# Patient Record
Sex: FEMALE | Race: WHITE | NOT HISPANIC OR LATINO | Employment: UNEMPLOYED | URBAN - METROPOLITAN AREA
[De-identification: names, ages, dates, MRNs, and addresses within clinical notes are randomized per-mention and may not be internally consistent; named-entity substitution may affect disease eponyms.]

---

## 2023-02-02 PROBLEM — I10 BENIGN ESSENTIAL HYPERTENSION: Status: ACTIVE | Noted: 2023-02-02

## 2023-02-02 PROBLEM — R09.81 SINUS CONGESTION: Status: ACTIVE | Noted: 2023-02-02

## 2023-02-02 PROBLEM — E78.1 HYPERTRIGLYCERIDEMIA: Status: ACTIVE | Noted: 2023-02-02

## 2023-02-02 PROBLEM — K21.9 GERD WITHOUT ESOPHAGITIS: Status: ACTIVE | Noted: 2023-02-02

## 2023-02-02 PROBLEM — M17.10 ARTHRITIS OF KNEE: Status: ACTIVE | Noted: 2023-02-02

## 2023-02-02 PROBLEM — E78.5 HYPERLIPIDEMIA: Status: ACTIVE | Noted: 2023-02-02

## 2023-02-02 PROBLEM — H10.33 ACUTE CONJUNCTIVITIS, BILATERAL: Status: ACTIVE | Noted: 2023-02-02

## 2023-02-02 RX ORDER — VALSARTAN AND HYDROCHLOROTHIAZIDE 160; 12.5 MG/1; MG/1
1 TABLET, FILM COATED ORAL DAILY
COMMUNITY
Start: 2021-05-04 | End: 2023-03-16 | Stop reason: SDUPTHER

## 2023-02-02 RX ORDER — ALUMINUM ZIRCONIUM OCTACHLOROHYDREX GLY 16 G/100G
GEL TOPICAL
COMMUNITY
Start: 2022-06-10 | End: 2023-09-13 | Stop reason: ALTCHOICE

## 2023-02-02 RX ORDER — CELECOXIB 200 MG/1
1-2 CAPSULE ORAL DAILY
COMMUNITY
Start: 2021-12-06 | End: 2023-03-16 | Stop reason: SDUPTHER

## 2023-03-15 LAB
ALANINE AMINOTRANSFERASE (SGPT) (U/L) IN SER/PLAS: 18 U/L (ref 7–45)
ALBUMIN (G/DL) IN SER/PLAS: 4.2 G/DL (ref 3.4–5)
ALKALINE PHOSPHATASE (U/L) IN SER/PLAS: 57 U/L (ref 33–136)
ANION GAP IN SER/PLAS: 11 MMOL/L (ref 10–20)
ASPARTATE AMINOTRANSFERASE (SGOT) (U/L) IN SER/PLAS: 17 U/L (ref 9–39)
BILIRUBIN TOTAL (MG/DL) IN SER/PLAS: 0.7 MG/DL (ref 0–1.2)
CALCIUM (MG/DL) IN SER/PLAS: 10 MG/DL (ref 8.6–10.3)
CARBON DIOXIDE, TOTAL (MMOL/L) IN SER/PLAS: 31 MMOL/L (ref 21–32)
CHLORIDE (MMOL/L) IN SER/PLAS: 102 MMOL/L (ref 98–107)
CHOLESTEROL (MG/DL) IN SER/PLAS: 242 MG/DL (ref 0–199)
CHOLESTEROL IN HDL (MG/DL) IN SER/PLAS: 77.7 MG/DL
CHOLESTEROL/HDL RATIO: 3.1
CREATININE (MG/DL) IN SER/PLAS: 0.86 MG/DL (ref 0.5–1.05)
GFR FEMALE: 74 ML/MIN/1.73M2
GLUCOSE (MG/DL) IN SER/PLAS: 98 MG/DL (ref 74–99)
LDL: 128 MG/DL (ref 0–99)
POTASSIUM (MMOL/L) IN SER/PLAS: 4.2 MMOL/L (ref 3.5–5.3)
PROTEIN TOTAL: 6.7 G/DL (ref 6.4–8.2)
SODIUM (MMOL/L) IN SER/PLAS: 140 MMOL/L (ref 136–145)
TRIGLYCERIDE (MG/DL) IN SER/PLAS: 182 MG/DL (ref 0–149)
UREA NITROGEN (MG/DL) IN SER/PLAS: 16 MG/DL (ref 6–23)
VLDL: 36 MG/DL (ref 0–40)

## 2023-03-16 ENCOUNTER — OFFICE VISIT (OUTPATIENT)
Dept: PRIMARY CARE | Facility: CLINIC | Age: 68
End: 2023-03-16
Payer: MEDICARE

## 2023-03-16 VITALS
OXYGEN SATURATION: 98 % | HEIGHT: 68 IN | SYSTOLIC BLOOD PRESSURE: 130 MMHG | HEART RATE: 99 BPM | BODY MASS INDEX: 33.07 KG/M2 | TEMPERATURE: 96.9 F | WEIGHT: 218.2 LBS | DIASTOLIC BLOOD PRESSURE: 78 MMHG

## 2023-03-16 DIAGNOSIS — M17.10 ARTHRITIS OF KNEE: ICD-10-CM

## 2023-03-16 DIAGNOSIS — Z12.31 BREAST CANCER SCREENING BY MAMMOGRAM: ICD-10-CM

## 2023-03-16 DIAGNOSIS — Z00.00 MEDICARE ANNUAL WELLNESS VISIT, SUBSEQUENT: ICD-10-CM

## 2023-03-16 DIAGNOSIS — I10 BENIGN ESSENTIAL HYPERTENSION: Primary | ICD-10-CM

## 2023-03-16 DIAGNOSIS — E78.00 PURE HYPERCHOLESTEROLEMIA: ICD-10-CM

## 2023-03-16 DIAGNOSIS — Z00.00 ROUTINE GENERAL MEDICAL EXAMINATION AT HEALTH CARE FACILITY: ICD-10-CM

## 2023-03-16 PROBLEM — K21.9 GERD WITHOUT ESOPHAGITIS: Status: RESOLVED | Noted: 2023-02-02 | Resolved: 2023-03-16

## 2023-03-16 PROBLEM — R09.81 SINUS CONGESTION: Status: RESOLVED | Noted: 2023-02-02 | Resolved: 2023-03-16

## 2023-03-16 PROBLEM — H10.33 ACUTE CONJUNCTIVITIS, BILATERAL: Status: RESOLVED | Noted: 2023-02-02 | Resolved: 2023-03-16

## 2023-03-16 PROCEDURE — G0439 PPPS, SUBSEQ VISIT: HCPCS | Performed by: FAMILY MEDICINE

## 2023-03-16 PROCEDURE — 3075F SYST BP GE 130 - 139MM HG: CPT | Performed by: FAMILY MEDICINE

## 2023-03-16 PROCEDURE — 1036F TOBACCO NON-USER: CPT | Performed by: FAMILY MEDICINE

## 2023-03-16 PROCEDURE — 99397 PER PM REEVAL EST PAT 65+ YR: CPT | Performed by: FAMILY MEDICINE

## 2023-03-16 PROCEDURE — 3078F DIAST BP <80 MM HG: CPT | Performed by: FAMILY MEDICINE

## 2023-03-16 PROCEDURE — 1159F MED LIST DOCD IN RCRD: CPT | Performed by: FAMILY MEDICINE

## 2023-03-16 PROCEDURE — 1170F FXNL STATUS ASSESSED: CPT | Performed by: FAMILY MEDICINE

## 2023-03-16 RX ORDER — CELECOXIB 200 MG/1
200-400 CAPSULE ORAL 2 TIMES DAILY
Qty: 180 CAPSULE | Refills: 1 | Status: SHIPPED | OUTPATIENT
Start: 2023-03-16 | End: 2023-11-28 | Stop reason: ALTCHOICE

## 2023-03-16 RX ORDER — VALSARTAN AND HYDROCHLOROTHIAZIDE 160; 12.5 MG/1; MG/1
1 TABLET, FILM COATED ORAL DAILY
Qty: 90 TABLET | Refills: 1 | Status: SHIPPED | OUTPATIENT
Start: 2023-03-16 | End: 2023-09-13 | Stop reason: SDUPTHER

## 2023-03-16 ASSESSMENT — ENCOUNTER SYMPTOMS
DEPRESSION: 0
LOSS OF SENSATION IN FEET: 0
OCCASIONAL FEELINGS OF UNSTEADINESS: 0
ARTHRALGIAS: 1

## 2023-03-16 ASSESSMENT — ACTIVITIES OF DAILY LIVING (ADL)
TOILETING: INDEPENDENT
GROOMING: INDEPENDENT
PATIENT'S MEMORY ADEQUATE TO SAFELY COMPLETE DAILY ACTIVITIES?: YES
FEEDING YOURSELF: INDEPENDENT
BATHING: INDEPENDENT
JUDGMENT_ADEQUATE_SAFELY_COMPLETE_DAILY_ACTIVITIES: YES
WALKS IN HOME: INDEPENDENT
ADEQUATE_TO_COMPLETE_ADL: YES
HEARING - RIGHT EAR: FUNCTIONAL
HEARING - LEFT EAR: FUNCTIONAL
DRESSING YOURSELF: INDEPENDENT
ASSISTIVE_DEVICE: EYEGLASSES

## 2023-03-16 ASSESSMENT — PATIENT HEALTH QUESTIONNAIRE - PHQ9
2. FEELING DOWN, DEPRESSED OR HOPELESS: NOT AT ALL
SUM OF ALL RESPONSES TO PHQ9 QUESTIONS 1 AND 2: 0
1. LITTLE INTEREST OR PLEASURE IN DOING THINGS: NOT AT ALL

## 2023-09-13 ENCOUNTER — HOSPITAL ENCOUNTER (OUTPATIENT)
Dept: DATA CONVERSION | Facility: HOSPITAL | Age: 68
Discharge: HOME | End: 2023-09-13

## 2023-09-13 ENCOUNTER — OFFICE VISIT (OUTPATIENT)
Dept: PRIMARY CARE | Facility: CLINIC | Age: 68
End: 2023-09-13
Payer: MEDICARE

## 2023-09-13 VITALS
OXYGEN SATURATION: 97 % | BODY MASS INDEX: 33.75 KG/M2 | SYSTOLIC BLOOD PRESSURE: 176 MMHG | WEIGHT: 222 LBS | DIASTOLIC BLOOD PRESSURE: 84 MMHG | HEART RATE: 88 BPM

## 2023-09-13 DIAGNOSIS — F41.9 ANXIETY: ICD-10-CM

## 2023-09-13 DIAGNOSIS — M25.361 OTHER INSTABILITY, RIGHT KNEE: ICD-10-CM

## 2023-09-13 DIAGNOSIS — I10 BENIGN ESSENTIAL HYPERTENSION: ICD-10-CM

## 2023-09-13 DIAGNOSIS — M17.10 ARTHRITIS OF KNEE: ICD-10-CM

## 2023-09-13 DIAGNOSIS — M17.10 UNILATERAL PRIMARY OSTEOARTHRITIS, UNSPECIFIED KNEE: ICD-10-CM

## 2023-09-13 DIAGNOSIS — E78.1 HYPERTRIGLYCERIDEMIA: Primary | ICD-10-CM

## 2023-09-13 PROCEDURE — 1160F RVW MEDS BY RX/DR IN RCRD: CPT | Performed by: FAMILY MEDICINE

## 2023-09-13 PROCEDURE — 1159F MED LIST DOCD IN RCRD: CPT | Performed by: FAMILY MEDICINE

## 2023-09-13 PROCEDURE — 1036F TOBACCO NON-USER: CPT | Performed by: FAMILY MEDICINE

## 2023-09-13 PROCEDURE — 99214 OFFICE O/P EST MOD 30 MIN: CPT | Performed by: FAMILY MEDICINE

## 2023-09-13 PROCEDURE — 3077F SYST BP >= 140 MM HG: CPT | Performed by: FAMILY MEDICINE

## 2023-09-13 PROCEDURE — 3079F DIAST BP 80-89 MM HG: CPT | Performed by: FAMILY MEDICINE

## 2023-09-13 RX ORDER — VALSARTAN AND HYDROCHLOROTHIAZIDE 160; 12.5 MG/1; MG/1
1 TABLET, FILM COATED ORAL DAILY
Qty: 90 TABLET | Refills: 1 | Status: SHIPPED | OUTPATIENT
Start: 2023-09-13 | End: 2023-12-13 | Stop reason: SDUPTHER

## 2023-09-13 RX ORDER — HYDROXYZINE HYDROCHLORIDE 25 MG/1
25 TABLET, FILM COATED ORAL 2 TIMES DAILY
Qty: 60 TABLET | Refills: 3 | Status: SHIPPED | OUTPATIENT
Start: 2023-09-13 | End: 2023-11-28 | Stop reason: WASHOUT

## 2023-09-13 ASSESSMENT — ENCOUNTER SYMPTOMS
DEPRESSION: 0
ARTHRALGIAS: 1
OCCASIONAL FEELINGS OF UNSTEADINESS: 0
LOSS OF SENSATION IN FEET: 0

## 2023-09-13 ASSESSMENT — PATIENT HEALTH QUESTIONNAIRE - PHQ9
1. LITTLE INTEREST OR PLEASURE IN DOING THINGS: NOT AT ALL
2. FEELING DOWN, DEPRESSED OR HOPELESS: NOT AT ALL
SUM OF ALL RESPONSES TO PHQ9 QUESTIONS 1 AND 2: 0

## 2023-09-13 NOTE — PROGRESS NOTES
Subjective   Patient ID: Iqra Oh is a 67 y.o. female who presents for Follow-up (FOLLOW UP).    HPI     Review of Systems   Musculoskeletal:  Positive for arthralgias.   All other systems reviewed and are negative.  Pt is exp knee pain  No swelling  Pt denies cp, sob,edema, dizziness  Having some anxiety situaltional    Objective   /84   Pulse 88   Wt 101 kg (222 lb)   SpO2 97%   BMI 33.75 kg/m²     Physical Exam  Constitutional:       Appearance: Normal appearance.   HENT:      Head: Normocephalic and atraumatic.      Right Ear: Tympanic membrane, ear canal and external ear normal.      Left Ear: Tympanic membrane, ear canal and external ear normal.      Nose: Nose normal.      Mouth/Throat:      Mouth: Mucous membranes are moist.      Pharynx: Oropharynx is clear.   Eyes:      Extraocular Movements: Extraocular movements intact.      Conjunctiva/sclera: Conjunctivae normal.      Pupils: Pupils are equal, round, and reactive to light.   Cardiovascular:      Rate and Rhythm: Normal rate and regular rhythm.      Pulses: Normal pulses.      Heart sounds: Normal heart sounds.   Pulmonary:      Effort: Pulmonary effort is normal.      Breath sounds: Normal breath sounds.   Abdominal:      General: Abdomen is flat. Bowel sounds are normal.      Palpations: Abdomen is soft.   Musculoskeletal:         General: Tenderness present.      Cervical back: Normal range of motion and neck supple.      Comments: Dec rom   Skin:     General: Skin is warm and dry.      Capillary Refill: Capillary refill takes less than 2 seconds.   Neurological:      General: No focal deficit present.      Mental Status: She is alert and oriented to person, place, and time.   Psychiatric:         Mood and Affect: Mood normal.         Behavior: Behavior normal.         Thought Content: Thought content normal.     Repeat /90    Assessment/Plan   Diagnoses and all orders for this visit:  Hypertriglyceridemia  Benign essential  hypertension  Arthritis of knee  Anxiety atarax prn  Watch sodium, try calm or headspace rto 3m  Call if BP stays elevated

## 2023-09-15 ENCOUNTER — HOSPITAL ENCOUNTER (OUTPATIENT)
Dept: DATA CONVERSION | Facility: HOSPITAL | Age: 68
Discharge: HOME | End: 2023-09-15

## 2023-09-15 DIAGNOSIS — M17.11 UNILATERAL PRIMARY OSTEOARTHRITIS, RIGHT KNEE: ICD-10-CM

## 2023-09-15 DIAGNOSIS — Z01.818 ENCOUNTER FOR OTHER PREPROCEDURAL EXAMINATION: ICD-10-CM

## 2023-09-15 LAB
ALBUMIN SERPL-MCNC: 4.9 GM/DL (ref 3.5–5)
ALBUMIN/GLOB SERPL: 1.8 RATIO (ref 1.5–3)
ALP BLD-CCNC: 73 U/L (ref 35–125)
ALT SERPL-CCNC: 26 U/L (ref 5–40)
ANION GAP SERPL CALCULATED.3IONS-SCNC: 12 MMOL/L (ref 0–19)
ANTICOAGULANT: NORMAL
APTT PPP: 23.1 SEC (ref 22–32.5)
AST SERPL-CCNC: 23 U/L (ref 5–40)
BASOPHILS # BLD AUTO: 0.01 K/UL (ref 0–0.22)
BASOPHILS NFR BLD AUTO: 0.2 % (ref 0–1)
BILIRUB SERPL-MCNC: 0.5 MG/DL (ref 0.1–1.2)
BILIRUB UR QL STRIP.AUTO: NEGATIVE
BUN SERPL-MCNC: 17 MG/DL (ref 8–25)
BUN/CREAT SERPL: 17 RATIO (ref 8–21)
CALCIUM SERPL-MCNC: 10.5 MG/DL (ref 8.5–10.4)
CHLORIDE SERPL-SCNC: 100 MMOL/L (ref 97–107)
CLARITY UR: CLEAR
CO2 SERPL-SCNC: 27 MMOL/L (ref 24–31)
COLOR UR: YELLOW
CREAT SERPL-MCNC: 1 MG/DL (ref 0.4–1.6)
DEPRECATED RDW RBC AUTO: 39.1 FL (ref 37–54)
DIFFERENTIAL METHOD BLD: ABNORMAL
EOSINOPHIL # BLD AUTO: 0.04 K/UL (ref 0–0.45)
EOSINOPHIL NFR BLD: 0.6 % (ref 0–3)
EPITH CASTS #/AREA UR COMP ASSIST: ABNORMAL /HPF
ERYTHROCYTE [DISTWIDTH] IN BLOOD BY AUTOMATED COUNT: 11.7 % (ref 11.7–15)
GFR SERPL CREATININE-BSD FRML MDRD: 62 ML/MIN/1.73 M2
GLOBULIN SER-MCNC: 2.8 G/DL (ref 1.9–3.7)
GLUCOSE SERPL-MCNC: 110 MG/DL (ref 65–99)
GLUCOSE UR STRIP.AUTO-MCNC: NEGATIVE MG/DL
HCT VFR BLD AUTO: 44.8 % (ref 36–44)
HGB BLD-MCNC: 15.1 GM/DL (ref 12–15)
HGB UR QL STRIP.AUTO: ABNORMAL /HPF (ref 0–3)
HGB UR QL: ABNORMAL
IMM GRANULOCYTES # BLD AUTO: 0.01 K/UL (ref 0–0.1)
INR PPP: 1 (ref 0.86–1.16)
KETONES UR QL STRIP.AUTO: NEGATIVE
LEUKOCYTE ESTERASE UR QL STRIP.AUTO: NEGATIVE
LYMPHOCYTES # BLD AUTO: 1.6 K/UL (ref 1.2–3.2)
LYMPHOCYTES NFR BLD MANUAL: 25.6 % (ref 20–40)
MCH RBC QN AUTO: 30.4 PG (ref 26–34)
MCHC RBC AUTO-ENTMCNC: 33.7 % (ref 31–37)
MCV RBC AUTO: 90.1 FL (ref 80–100)
MICROSCOPIC (UA): ABNORMAL
MONOCYTES # BLD AUTO: 0.48 K/UL (ref 0–0.8)
MONOCYTES NFR BLD MANUAL: 7.7 % (ref 0–8)
MRSA DNA SPEC QL NAA+PROBE: NEGATIVE
NEUTROPHILS # BLD AUTO: 4.11 K/UL
NEUTROPHILS # BLD AUTO: 4.11 K/UL (ref 1.8–7.7)
NEUTROPHILS.IMMATURE NFR BLD: 0.2 % (ref 0–1)
NEUTS SEG NFR BLD: 65.7 % (ref 50–70)
NITRITE UR QL STRIP.AUTO: NEGATIVE
PH UR STRIP.AUTO: 6 [PH] (ref 4.6–8)
PLATELET # BLD AUTO: 281 K/UL (ref 150–450)
PMV BLD AUTO: 10.5 CU (ref 7–12.6)
POTASSIUM SERPL-SCNC: 4.1 MMOL/L (ref 3.4–5.1)
PROT SERPL-MCNC: 7.7 G/DL (ref 5.9–7.9)
PROT UR STRIP.AUTO-MCNC: NEGATIVE MG/DL
PROTHROMBIN TIME: 9.3 SEC (ref 9.3–12.7)
RBC # BLD AUTO: 4.97 M/UL (ref 4–4.9)
SODIUM SERPL-SCNC: 139 MMOL/L (ref 133–145)
SP GR UR STRIP.AUTO: 1.01 (ref 1–1.03)
SPECIMEN SOURCE: NORMAL
URINE CULTURE: ABNORMAL
UROBILINOGEN UR QL STRIP.AUTO: 0.2 MG/DL (ref 0–1)
WBC # BLD AUTO: 6.3 K/UL (ref 4.5–11)
WBC #/AREA URNS AUTO: ABNORMAL /HPF (ref 0–3)

## 2023-09-29 ENCOUNTER — HOSPITAL ENCOUNTER (OUTPATIENT)
Dept: DATA CONVERSION | Facility: HOSPITAL | Age: 68
Discharge: HOME | End: 2023-09-29

## 2023-09-29 ENCOUNTER — HOME HEALTH ADMISSION (OUTPATIENT)
Dept: HOME HEALTH SERVICES | Facility: HOME HEALTH | Age: 68
End: 2023-09-29
Payer: MEDICARE

## 2023-09-29 DIAGNOSIS — M17.11 UNILATERAL PRIMARY OSTEOARTHRITIS, RIGHT KNEE: ICD-10-CM

## 2023-09-30 ENCOUNTER — HOME CARE VISIT (OUTPATIENT)
Dept: HOME HEALTH SERVICES | Facility: HOME HEALTH | Age: 68
End: 2023-09-30
Payer: MEDICARE

## 2023-09-30 PROCEDURE — 1090000002 HH PPS REVENUE DEBIT

## 2023-09-30 PROCEDURE — G0151 HHCP-SERV OF PT,EA 15 MIN: HCPCS | Mod: HHH

## 2023-09-30 PROCEDURE — 0023 HH SOC

## 2023-09-30 PROCEDURE — 1090000001 HH PPS REVENUE CREDIT

## 2023-09-30 PROCEDURE — 169592 NO-PAY CLAIM PROCEDURE

## 2023-10-01 VITALS — DIASTOLIC BLOOD PRESSURE: 66 MMHG | SYSTOLIC BLOOD PRESSURE: 120 MMHG | HEART RATE: 70 BPM

## 2023-10-01 PROCEDURE — 1090000002 HH PPS REVENUE DEBIT

## 2023-10-01 PROCEDURE — 1090000001 HH PPS REVENUE CREDIT

## 2023-10-01 SDOH — HEALTH STABILITY: PHYSICAL HEALTH: EXERCISE ACTIVITY: SLR 3 DIRECTIONS

## 2023-10-01 SDOH — HEALTH STABILITY: PHYSICAL HEALTH: EXERCISE ACTIVITY: AP

## 2023-10-01 SDOH — HEALTH STABILITY: PHYSICAL HEALTH: EXERCISE ACTIVITIES SETS: 1

## 2023-10-01 SDOH — HEALTH STABILITY: PHYSICAL HEALTH: EXERCISE ACTIVITY: HS

## 2023-10-01 SDOH — HEALTH STABILITY: PHYSICAL HEALTH: EXERCISE ACTIVITY: QS

## 2023-10-01 SDOH — HEALTH STABILITY: PHYSICAL HEALTH: EXERCISE ACTIVITY: HR

## 2023-10-01 SDOH — HEALTH STABILITY: PHYSICAL HEALTH: PHYSICAL EXERCISE: 15

## 2023-10-01 SDOH — HEALTH STABILITY: PHYSICAL HEALTH: EXERCISE ACTIVITY: SAQ

## 2023-10-01 ASSESSMENT — PAIN SCALES - PAIN ASSESSMENT IN ADVANCED DEMENTIA (PAINAD)
BODYLANGUAGE: 0
CONSOLABILITY: 0
BODYLANGUAGE: 0 - RELAXED.
TOTALSCORE: 0
CONSOLABILITY: 0 - NO NEED TO CONSOLE.
FACIALEXPRESSION: 0 - SMILING OR INEXPRESSIVE.
NEGVOCALIZATION: 0 - NONE.
BREATHING: 0
FACIALEXPRESSION: 0
NEGVOCALIZATION: 0

## 2023-10-01 ASSESSMENT — ENCOUNTER SYMPTOMS
OCCASIONAL FEELINGS OF UNSTEADINESS: 1
PAIN: 1
LOWEST PAIN SEVERITY IN PAST 24 HOURS: 4/10
PERSON REPORTING PAIN: PATIENT
HIGHEST PAIN SEVERITY IN PAST 24 HOURS: 7/10
PAIN LOCATION: RIGHT KNEE
DEPRESSION: 0
LIMITED RANGE OF MOTION: 1
MUSCLE WEAKNESS: 1
LOSS OF SENSATION IN FEET: 1
PAIN SEVERITY GOAL: 3/10
SUBJECTIVE PAIN PROGRESSION: GRADUALLY IMPROVING

## 2023-10-01 ASSESSMENT — ACTIVITIES OF DAILY LIVING (ADL)
AMBULATION ASSISTANCE ON FLAT SURFACES: 1
AMBULATION ASSISTANCE: 1
OASIS_M1830: 02
CURRENT_FUNCTION: MODERATE ASSIST
AMBULATION ASSISTANCE: STAND BY ASSIST
PHYSICAL TRANSFERS ASSESSED: 1

## 2023-10-02 ENCOUNTER — HOME CARE VISIT (OUTPATIENT)
Dept: HOME HEALTH SERVICES | Facility: HOME HEALTH | Age: 68
End: 2023-10-02
Payer: MEDICARE

## 2023-10-02 PROCEDURE — 1090000001 HH PPS REVENUE CREDIT

## 2023-10-02 PROCEDURE — 1090000002 HH PPS REVENUE DEBIT

## 2023-10-03 ENCOUNTER — HOME CARE VISIT (OUTPATIENT)
Dept: HOME HEALTH SERVICES | Facility: HOME HEALTH | Age: 68
End: 2023-10-03
Payer: MEDICARE

## 2023-10-03 VITALS — SYSTOLIC BLOOD PRESSURE: 118 MMHG | HEART RATE: 70 BPM | TEMPERATURE: 98.7 F | DIASTOLIC BLOOD PRESSURE: 80 MMHG

## 2023-10-03 PROCEDURE — 1090000001 HH PPS REVENUE CREDIT

## 2023-10-03 PROCEDURE — G0151 HHCP-SERV OF PT,EA 15 MIN: HCPCS | Mod: HHH

## 2023-10-03 PROCEDURE — 1090000002 HH PPS REVENUE DEBIT

## 2023-10-03 SDOH — HEALTH STABILITY: PHYSICAL HEALTH: EXERCISE TYPE: TKA PROTOCOL SUPINE AND STANDING

## 2023-10-03 SDOH — HEALTH STABILITY: PHYSICAL HEALTH
EXERCISE COMMENTS: 10 REPETITIONS OF SUPINE HEP. ADDED STANDING EXERCISES TO HEP COMPLETED WITH VERBAL CUES AND DEMONSTRATION

## 2023-10-03 ASSESSMENT — ENCOUNTER SYMPTOMS
OCCASIONAL FEELINGS OF UNSTEADINESS: 0
MUSCLE WEAKNESS: 1
SUBJECTIVE PAIN PROGRESSION: GRADUALLY IMPROVING
PAIN SEVERITY GOAL: 2/10
PERSON REPORTING PAIN: PATIENT
LOWEST PAIN SEVERITY IN PAST 24 HOURS: 4/10
PAIN: 1
PAIN LOCATION: RIGHT KNEE
HIGHEST PAIN SEVERITY IN PAST 24 HOURS: 8/10
LIMITED RANGE OF MOTION: 1

## 2023-10-03 ASSESSMENT — ACTIVITIES OF DAILY LIVING (ADL)
AMBULATION ASSISTANCE: STAND BY ASSIST
AMBULATION ASSISTANCE: 1
ENTERING_EXITING_HOME: NEEDS ASSISTANCE

## 2023-10-04 ENCOUNTER — TELEPHONE (OUTPATIENT)
Dept: ORTHOPEDIC SURGERY | Facility: CLINIC | Age: 68
End: 2023-10-04
Payer: MEDICARE

## 2023-10-04 DIAGNOSIS — M17.11 PRIMARY OSTEOARTHRITIS OF RIGHT KNEE: Primary | ICD-10-CM

## 2023-10-04 PROCEDURE — 1090000002 HH PPS REVENUE DEBIT

## 2023-10-04 PROCEDURE — 1090000001 HH PPS REVENUE CREDIT

## 2023-10-04 NOTE — TELEPHONE ENCOUNTER
Patient had R TKA HUSSAIN on 9/29/23. She is requesting refill on Oxycodone 5mg and Tramadol 50mg.  [facility]

## 2023-10-05 PROCEDURE — 1090000001 HH PPS REVENUE CREDIT

## 2023-10-05 PROCEDURE — 1090000002 HH PPS REVENUE DEBIT

## 2023-10-06 ENCOUNTER — HOME CARE VISIT (OUTPATIENT)
Dept: HOME HEALTH SERVICES | Facility: HOME HEALTH | Age: 68
End: 2023-10-06
Payer: MEDICARE

## 2023-10-06 ENCOUNTER — TELEPHONE (OUTPATIENT)
Dept: ORTHOPEDIC SURGERY | Facility: CLINIC | Age: 68
End: 2023-10-06
Payer: MEDICARE

## 2023-10-06 VITALS — HEART RATE: 80 BPM | TEMPERATURE: 98.7 F

## 2023-10-06 DIAGNOSIS — Z98.890 HISTORY OF KNEE SURGERY: Primary | ICD-10-CM

## 2023-10-06 DIAGNOSIS — Z98.890 HISTORY OF KNEE SURGERY: ICD-10-CM

## 2023-10-06 PROCEDURE — 1090000001 HH PPS REVENUE CREDIT

## 2023-10-06 PROCEDURE — 1090000002 HH PPS REVENUE DEBIT

## 2023-10-06 PROCEDURE — G0151 HHCP-SERV OF PT,EA 15 MIN: HCPCS | Mod: HHH

## 2023-10-06 RX ORDER — TRAMADOL HYDROCHLORIDE 50 MG/1
50 TABLET ORAL EVERY 6 HOURS PRN
Qty: 28 TABLET | Refills: 0 | Status: SHIPPED | OUTPATIENT
Start: 2023-10-06 | End: 2023-10-13

## 2023-10-06 RX ORDER — TRAMADOL HYDROCHLORIDE 50 MG/1
50 TABLET ORAL EVERY 6 HOURS PRN
Qty: 10 TABLET | Status: CANCELLED | OUTPATIENT
Start: 2023-10-06

## 2023-10-06 RX ORDER — TRAMADOL HYDROCHLORIDE 50 MG/1
50 TABLET ORAL EVERY 6 HOURS PRN
Qty: 28 TABLET | Refills: 0 | Status: SHIPPED | OUTPATIENT
Start: 2023-10-06 | End: 2023-10-06 | Stop reason: SDUPTHER

## 2023-10-06 RX ORDER — OXYCODONE HYDROCHLORIDE 5 MG/1
5 TABLET ORAL EVERY 6 HOURS PRN
Qty: 28 TABLET | Refills: 0 | Status: SHIPPED | OUTPATIENT
Start: 2023-10-06 | End: 2023-10-06

## 2023-10-06 RX ORDER — OXYCODONE HYDROCHLORIDE 5 MG/1
5 TABLET ORAL
Qty: 15 TABLET | Status: CANCELLED | OUTPATIENT
Start: 2023-10-06

## 2023-10-06 RX ORDER — OXYCODONE HYDROCHLORIDE 5 MG/1
5 TABLET ORAL
Qty: 15 TABLET | Refills: 0 | Status: CANCELLED | OUTPATIENT
Start: 2023-10-06

## 2023-10-06 RX ORDER — OXYCODONE HYDROCHLORIDE 5 MG/1
5 TABLET ORAL EVERY 6 HOURS PRN
Qty: 28 TABLET | Refills: 0 | Status: SHIPPED | OUTPATIENT
Start: 2023-10-06 | End: 2023-10-06 | Stop reason: SDUPTHER

## 2023-10-06 RX ORDER — TRAMADOL HYDROCHLORIDE 50 MG/1
50 TABLET ORAL EVERY 6 HOURS PRN
Qty: 10 TABLET | Refills: 0 | Status: CANCELLED | OUTPATIENT
Start: 2023-10-06

## 2023-10-06 RX ORDER — TRAMADOL HYDROCHLORIDE 50 MG/1
50 TABLET ORAL EVERY 6 HOURS PRN
Qty: 28 TABLET | Refills: 0 | Status: SHIPPED | OUTPATIENT
Start: 2023-10-06 | End: 2023-10-06

## 2023-10-06 RX ORDER — OXYCODONE HYDROCHLORIDE 5 MG/1
5 TABLET ORAL EVERY 6 HOURS PRN
Qty: 28 TABLET | Refills: 0 | Status: SHIPPED | OUTPATIENT
Start: 2023-10-06 | End: 2023-10-13

## 2023-10-06 RX ORDER — OXYCODONE HYDROCHLORIDE 5 MG/1
5 TABLET ORAL EVERY 6 HOURS PRN
Qty: 28 TABLET | Refills: 0 | Status: CANCELLED | OUTPATIENT
Start: 2023-10-06 | End: 2023-10-13

## 2023-10-06 SDOH — HEALTH STABILITY: PHYSICAL HEALTH
EXERCISE COMMENTS: SUPINE AND STANDING HEP WITH VERBAL CUES. ABLE TO COMPLETE 2 SETS OF 10. ADDED STAIR STRETCHES INTO FLEXION FOR 1 TO 3 MINUTES.

## 2023-10-06 SDOH — HEALTH STABILITY: PHYSICAL HEALTH: EXERCISE TYPE: THA R LE

## 2023-10-06 ASSESSMENT — ENCOUNTER SYMPTOMS
PAIN: 1
PERSON REPORTING PAIN: PATIENT
LIMITED RANGE OF MOTION: 1
LOWEST PAIN SEVERITY IN PAST 24 HOURS: 3/10
HIGHEST PAIN SEVERITY IN PAST 24 HOURS: 6/10
SUBJECTIVE PAIN PROGRESSION: GRADUALLY IMPROVING
PAIN SEVERITY GOAL: 1/10
MUSCLE WEAKNESS: 1
PAIN LOCATION: RIGHT KNEE

## 2023-10-06 ASSESSMENT — ACTIVITIES OF DAILY LIVING (ADL)
AMBULATION_DISTANCE/DURATION_TOLERATED: 50 FEET
AMBULATION ASSISTANCE: STAND BY ASSIST
CURRENT_FUNCTION: STAND BY ASSIST
ENTERING_EXITING_HOME: MINIMUM ASSIST

## 2023-10-06 NOTE — HOME HEALTH
Treatment note: Surgical bandage was removed. The bandage underneath was stuck to the mepilex dressing and started to come off. Switched to removing it from the bottom and trimmed off the top that was no longer adhered to wound. Incision is intact with no drainage or signs of infection. Instructed in supine and standing HEP and she was able to complete 2 sets of 10. Added stair stretch to improve knee flexion. She called the surgeons office for a refill of pain medication and is waiting to hear back today. Making steady progress. She was able to schedule outpatient PT for 10/16/23.

## 2023-10-06 NOTE — TELEPHONE ENCOUNTER
Can you please send in a refill of the patients Oxycodone and Tramadol to [facility] in [place]? Thanks.

## 2023-10-06 NOTE — TELEPHONE ENCOUNTER
Patient is calling for a refill on her Oxycodone 5mg and Tramadol 50mg please send to pharmacy on file.

## 2023-10-07 PROCEDURE — 1090000002 HH PPS REVENUE DEBIT

## 2023-10-07 PROCEDURE — 1090000001 HH PPS REVENUE CREDIT

## 2023-10-08 PROCEDURE — 1090000001 HH PPS REVENUE CREDIT

## 2023-10-08 PROCEDURE — 1090000002 HH PPS REVENUE DEBIT

## 2023-10-09 ENCOUNTER — HOME CARE VISIT (OUTPATIENT)
Dept: HOME HEALTH SERVICES | Facility: HOME HEALTH | Age: 68
End: 2023-10-09
Payer: MEDICARE

## 2023-10-09 VITALS — HEART RATE: 76 BPM | DIASTOLIC BLOOD PRESSURE: 76 MMHG | SYSTOLIC BLOOD PRESSURE: 120 MMHG | TEMPERATURE: 98.6 F

## 2023-10-09 PROCEDURE — 1090000001 HH PPS REVENUE CREDIT

## 2023-10-09 PROCEDURE — G0180 MD CERTIFICATION HHA PATIENT: HCPCS | Performed by: ORTHOPAEDIC SURGERY

## 2023-10-09 PROCEDURE — 1090000002 HH PPS REVENUE DEBIT

## 2023-10-09 PROCEDURE — G0151 HHCP-SERV OF PT,EA 15 MIN: HCPCS | Mod: HHH

## 2023-10-10 ENCOUNTER — OFFICE VISIT (OUTPATIENT)
Dept: ORTHOPEDIC SURGERY | Facility: CLINIC | Age: 68
End: 2023-10-10
Payer: MEDICARE

## 2023-10-10 VITALS — HEIGHT: 68 IN | BODY MASS INDEX: 32.74 KG/M2 | WEIGHT: 216 LBS

## 2023-10-10 DIAGNOSIS — M17.11 LOCALIZED OSTEOARTHRITIS OF RIGHT KNEE: ICD-10-CM

## 2023-10-10 PROCEDURE — 99024 POSTOP FOLLOW-UP VISIT: CPT | Performed by: ORTHOPAEDIC SURGERY

## 2023-10-10 PROCEDURE — 1090000002 HH PPS REVENUE DEBIT

## 2023-10-10 PROCEDURE — 1160F RVW MEDS BY RX/DR IN RCRD: CPT | Performed by: ORTHOPAEDIC SURGERY

## 2023-10-10 PROCEDURE — 1090000001 HH PPS REVENUE CREDIT

## 2023-10-10 PROCEDURE — 1036F TOBACCO NON-USER: CPT | Performed by: ORTHOPAEDIC SURGERY

## 2023-10-10 PROCEDURE — 1159F MED LIST DOCD IN RCRD: CPT | Performed by: ORTHOPAEDIC SURGERY

## 2023-10-10 NOTE — PROGRESS NOTES
ORTHOPEDIC TOTAL JOINT  POST-OPERATIVE FOLLOW UP      ============================  IMPRESSION/PLAN:  ============================  68 y.o. female s/p Right Total Knee Replacement completed on 09/29/2023.    PLAN:  -Weightbearing: Weight bearing as tolerated  -DVT Prophylaxis: Continue aspirin 81 mg twice a day for 2 more weeks  -Radiology Studies: None today  -Therapies: Continue PT/OT, okay to begin outpatient therapy  -Transition off assistive device as seen fit by patient and therapy  -Follow-up: 4 weeks        Caroline COY Adriano presents today for follow up of joint replacement above. Pain controlled with current treatment plan. Patient has begun physical therapy. They are currently doing therapy at home. She is scheduled to start outpatient therapy next week.  They are currently ambulating with Walker.     Review of Systems:   Constitutional: See HPI for pain assessment, No significant weight loss, recent trauma. Denies fevers/chills  Cardiovascular: No chest pain, shortness of breath  Respiratory: No difficulty breathing, cough  Gastrointestinal: No nausea, vomiting, diarrhea, constipation  Musculoskeletal: Noted in HPI, no arthralgias   Integumentary: No rashes, easy bruising, redness   Neurological: no numbness or tingling in extremities, no gait disturbances     Patient Active Problem List   Diagnosis    Arthritis of knee    Benign essential hypertension    Hyperlipidemia    Hypertriglyceridemia    Medicare annual wellness visit, subsequent    Anxiety       =================================  EXAM  =================================  GENERAL: A/Ox3, NAD. Appears healthy, well nourished  CARDIAC: regular rate  LUNGS: Breathing non-labored    MUSCULOSKELETAL:  Laterality: right Knee Exam  - Incision: No overlying, erythema, induration, or drainage. Incision healing well with no wound dehiscence  - Palpation: minimal TTP along surgical incision  - Effusion: appropriated post op, 2+  - ROM: 5 to 95 degrees  -  Stability: Anterior/Posterior stable, varus/valgus stable  - compartments soft, negative homans    NEUROVASCULAR:  - Neurovascular Status: sensation intact to light touch distally  - Capillary refill brisk at extremities, Bilateral dorsalis pedis pulse 2+       Sofia Hamilton DO  Attending Surgeon  Joint Replacement and Adult Reconstructive Surgery  [place], [place]

## 2023-10-11 PROCEDURE — 1090000001 HH PPS REVENUE CREDIT

## 2023-10-11 PROCEDURE — 1090000002 HH PPS REVENUE DEBIT

## 2023-10-12 ENCOUNTER — HOME CARE VISIT (OUTPATIENT)
Dept: HOME HEALTH SERVICES | Facility: HOME HEALTH | Age: 68
End: 2023-10-12
Payer: MEDICARE

## 2023-10-12 VITALS — DIASTOLIC BLOOD PRESSURE: 80 MMHG | HEART RATE: 76 BPM | TEMPERATURE: 98.6 F | SYSTOLIC BLOOD PRESSURE: 118 MMHG

## 2023-10-12 PROCEDURE — 1090000001 HH PPS REVENUE CREDIT

## 2023-10-12 PROCEDURE — 1090000002 HH PPS REVENUE DEBIT

## 2023-10-12 PROCEDURE — G0151 HHCP-SERV OF PT,EA 15 MIN: HCPCS | Mod: HHH

## 2023-10-12 SDOH — HEALTH STABILITY: PHYSICAL HEALTH: EXERCISE TYPE: TKA

## 2023-10-12 ASSESSMENT — ENCOUNTER SYMPTOMS
SUBJECTIVE PAIN PROGRESSION: GRADUALLY IMPROVING
OCCASIONAL FEELINGS OF UNSTEADINESS: 1
PAIN: 1
PAIN LOCATION: RIGHT KNEE
HIGHEST PAIN SEVERITY IN PAST 24 HOURS: 5/10
PERSON REPORTING PAIN: PATIENT
MUSCLE WEAKNESS: 1
LIMITED RANGE OF MOTION: 1
PAIN SEVERITY GOAL: 2/10
LOWEST PAIN SEVERITY IN PAST 24 HOURS: 2/10

## 2023-10-12 ASSESSMENT — ACTIVITIES OF DAILY LIVING (ADL)
HOME_HEALTH_OASIS: 00
OASIS_M1830: 00

## 2023-10-13 NOTE — HOME HEALTH
Physical Therapy Discharge Visit Summary:  Patient completed HEP 2 sets of 10 using handout. Knee ROM -5 to 92 degrees. She is using the cane a few times per day to practice gait pattern, but still primarily uses the FWW. She saw her surgeon on Tuesday. Outpatient PT scheduled for monday 10/16. Agency discharge completed. Patient signed medicare notice of non-coverage.

## 2023-10-16 ENCOUNTER — EVALUATION (OUTPATIENT)
Dept: PHYSICAL THERAPY | Facility: CLINIC | Age: 68
End: 2023-10-16
Payer: MEDICARE

## 2023-10-16 DIAGNOSIS — M17.11 LOCALIZED OSTEOARTHRITIS OF RIGHT KNEE: ICD-10-CM

## 2023-10-16 DIAGNOSIS — Z96.651 STATUS POST RIGHT KNEE REPLACEMENT: Primary | ICD-10-CM

## 2023-10-16 DIAGNOSIS — M25.561 RIGHT KNEE PAIN: ICD-10-CM

## 2023-10-16 PROCEDURE — 97161 PT EVAL LOW COMPLEX 20 MIN: CPT | Mod: GP | Performed by: PHYSICAL THERAPIST

## 2023-10-16 PROCEDURE — 97140 MANUAL THERAPY 1/> REGIONS: CPT | Mod: GP | Performed by: PHYSICAL THERAPIST

## 2023-10-16 ASSESSMENT — ENCOUNTER SYMPTOMS
LOSS OF SENSATION IN FEET: 0
OCCASIONAL FEELINGS OF UNSTEADINESS: 0
DEPRESSION: 0

## 2023-10-16 ASSESSMENT — PATIENT HEALTH QUESTIONNAIRE - PHQ9
2. FEELING DOWN, DEPRESSED OR HOPELESS: NOT AT ALL
1. LITTLE INTEREST OR PLEASURE IN DOING THINGS: NOT AT ALL
SUM OF ALL RESPONSES TO PHQ9 QUESTIONS 1 AND 2: 0

## 2023-10-16 NOTE — LETTER
October 17, 2023     Patient: Caroline Oh   YOB: 1955   Date of Visit: 10/16/2023       To Whom It May Concern:    It is my medical opinion that Caroline Oh {Work release (duty restriction):74099}.    If you have any questions or concerns, please don't hesitate to call.         Sincerely,        Reina Arango, PT    CC: No Recipients
October 17, 2023     Patient: Caroline Oh   YOB: 1955   Date of Visit: 10/16/2023       To Whom it May Concern:    Caroline Oh was seen in my clinic on 10/16/2023. She {Return to school/sport:15405}.    If you have any questions or concerns, please don't hesitate to call.         Sincerely,          Reina Arango, PT        CC: No Recipients
Structured MSE

## 2023-10-16 NOTE — PATIENT INSTRUCTIONS
Access Code: [email or web address]  URL: [email or web address]  Date: 10/16/2023  Prepared by: Reina Arango    Exercises  - Supine Heel Slide with Strap  - 1 x daily - 7 x weekly - 3 sets - 10 reps  - Supine Bridge  - 1 x daily - 7 x weekly - 3 sets - 10 reps  - Clamshell  - 1 x daily - 7 x weekly - 3 sets - 10 reps  - Sidelying Hip Abduction  - 1 x daily - 7 x weekly - 3 sets - 10 reps

## 2023-10-16 NOTE — PROGRESS NOTES
Physical Therapy Evaluation    Patient Name: Caroline Oh  MRN: [id number]  Today's Date: 10/17/2023  Visit: 1/auth required  Referred by:  Sofia Hamilton  Time Calculation  Start Time: 1411  Stop Time: 1452  Time Calculation (min): 41 min  Diagnosis:   1. Status post right knee replacement        2. Localized osteoarthritis of right knee  Referral to Physical Therapy      3. Right knee pain                       PHI  She had a R menisectomy in 2019.  OA was noted at this time.  She has had worsening pain due to the OA and went to her MD.  She was referred to Dr. Hamilton.  X-rays were done which were positive for (B) severe medial compartment and mild lat and patellofemoral compartment OA.  A R TKA was done on 9/29/23.  She had HH therapy after surgery.  She could flex to 92 degrees.  She had a follow up with ortho and he sent her to outpatient PT.     SUBJECTIVE  Reports pain located at the R medial and anterior knee rated 5/10 and described as throbbing that can be sharp depending on her motion.  Aggravating factors include knee flexion while relieving factors are meds, ice and stretching.  Functionally she is limited in stair amb, amb, transfers, driving, dressing and showering    The patient's is living with her  in a 2 story home with her bedroom on the 2nd floor.    Her goals for therapy are to decrease pain and increase ROM/strength and to return to walking for exercise.    OBJECTIVE  Observation:  - presents with ww.  She has started using a cane now.  Incision is healing well but has increased scar thickness as the distal aspect.  Note mod swelling throughout the R knee.    Palpation:  - tender at the distal/lat aspect of R knee over area of swelling.    AROM:   - R knee =  0 - 97     MMT:  - QS = strong  - R hip abd = 4+/5.  HS and quad = NT    Edema:  - Midpatellar girth = 49 cm R and 46 cm L          Joint Mobility:  - Decreased patellar mobility into inf/sup dir    Functional tool:  - LEFS score  = 18    ASSESSMENT  Patient is a 67 y/o female  that presents to physical therapy with S&S consistent with s/p R TKA.  As expected she has edema accompanied by decreased ROM, strength and WB.  This limits her ability to do ADLS such as amb with a normal gait, self care, transfer and stairs.  Physical therapy will be beneficial to address these impairments for a full return to ADLS.    PLAN  Initiate physical therapy that includes manual therapy, therapeutic exercise and modalities as needed.  She will be seen 2x/wk for 8 wks.    TODAY'S TREATMENT  - evaluation of the R knee completed  - manual therapy = knee flex mobs  - Hep given as seen below:  Access Code: [email or web address]  URL: [email or web address]  Date: 10/16/2023  Prepared by: Reina Arango    Exercises  - Supine Heel Slide with Strap  - 1 x daily - 7 x weekly - 3 sets - 10 reps  - Supine Bridge  - 1 x daily - 7 x weekly - 3 sets - 10 reps  - Clamshell  - 1 x daily - 7 x weekly - 3 sets - 10 reps  - Sidelying Hip Abduction  - 1 x daily - 7 x weekly - 3 sets - 10 reps    GOALS:   she will be independent with her HEP  2.  increase AROM to 0 - 120 so she can perform dressing and self care ADLS  3.  demonstrate patellar mobility into sup/inf dir to promote proper knee mechanics  4.  achieve 4/5 MMT of the R knee for stair amb  5.  amb with normal gait without assistive device

## 2023-10-17 DIAGNOSIS — M17.11 PRIMARY OSTEOARTHRITIS OF RIGHT KNEE: Primary | ICD-10-CM

## 2023-10-17 DIAGNOSIS — Z98.890 HISTORY OF KNEE SURGERY: ICD-10-CM

## 2023-10-17 RX ORDER — TRAMADOL HYDROCHLORIDE 50 MG/1
50 TABLET ORAL EVERY 6 HOURS PRN
Qty: 28 TABLET | Refills: 0 | Status: SHIPPED | OUTPATIENT
Start: 2023-10-17 | End: 2023-10-24

## 2023-10-17 RX ORDER — TRAMADOL HYDROCHLORIDE 50 MG/1
50 TABLET ORAL EVERY 6 HOURS PRN
Qty: 28 TABLET | Refills: 0 | Status: CANCELLED | OUTPATIENT
Start: 2023-10-17 | End: 2023-10-24

## 2023-10-17 NOTE — TELEPHONE ENCOUNTER
She would like a refill on Tramadol. She would like to have it on hand for PT. She is taking maybe BID and only has one pill left send to [facility]

## 2023-10-19 ENCOUNTER — TREATMENT (OUTPATIENT)
Dept: PHYSICAL THERAPY | Facility: CLINIC | Age: 68
End: 2023-10-19
Payer: MEDICARE

## 2023-10-19 DIAGNOSIS — Z96.651 STATUS POST RIGHT KNEE REPLACEMENT: ICD-10-CM

## 2023-10-19 DIAGNOSIS — M25.561 RIGHT KNEE PAIN: ICD-10-CM

## 2023-10-19 PROCEDURE — 97140 MANUAL THERAPY 1/> REGIONS: CPT | Mod: GP | Performed by: PHYSICAL THERAPIST

## 2023-10-19 PROCEDURE — 97110 THERAPEUTIC EXERCISES: CPT | Mod: GP | Performed by: PHYSICAL THERAPIST

## 2023-10-19 NOTE — PROGRESS NOTES
Physical Therapy Treatment    Patient Name: Caroline Oh  MRN: [id number]  Today's Date: 10/19/2023   Visit 2/12  Referred by:  Sofia Hamilton  Time Calculation  Start Time: 1535  Stop Time: 1627  Time Calculation (min): 52 min  Diagnosis:   1. Status post right knee replacement  Follow Up In Physical Therapy      2. Right knee pain  Follow Up In Physical Therapy            SUBJECTIVE:  Improved sleep with sidelying with a pillow at the knees.   She reports 2-3/10 discomfort generally throughout the R knee    HEP compliance: yes    OBJECTIVE:  - R knee active flex = 105 at start of rx ( increased to 0-112 after rx)    Treatment:  - initiated exercise for R knee ROM, WB and strength  Therapeutic Exercise  Therapeutic Exercise Activity 1: 4-way hip on table, 1 lb, x10  Therapeutic Exercise Activity 2: NuStep L1, 5 min  Therapeutic Exercise Activity 3: squat 2x10  Therapeutic Exercise Activity 4: calf raises 2x10  Therapeutic Exercise Activity 5: R SLS 30 sec x2  Therapeutic Exercise Activity 6: LAQ 1 lb, 2x10  Therapeutic Exercise Activity 7: HS curl, GN TB, 2x10  Manual Therapy  Manual Therapy Activity 1: R knee flex mobs     ASSESSMENT:  Tolerated rx well - challenged but no pain.  Demonstrated increased AROM into flex.  She will benefit from continued therapy to further improve ROM/strength for return to ADLS.    PLAN:  Increase exercise as ambar.

## 2023-10-23 ENCOUNTER — TREATMENT (OUTPATIENT)
Dept: PHYSICAL THERAPY | Facility: CLINIC | Age: 68
End: 2023-10-23
Payer: MEDICARE

## 2023-10-23 DIAGNOSIS — Z96.651 STATUS POST RIGHT KNEE REPLACEMENT: Primary | ICD-10-CM

## 2023-10-23 DIAGNOSIS — M25.561 RIGHT KNEE PAIN: ICD-10-CM

## 2023-10-23 PROCEDURE — 97110 THERAPEUTIC EXERCISES: CPT | Mod: GP | Performed by: PHYSICAL THERAPIST

## 2023-10-23 PROCEDURE — 97140 MANUAL THERAPY 1/> REGIONS: CPT | Mod: GP | Performed by: PHYSICAL THERAPIST

## 2023-10-23 NOTE — PROGRESS NOTES
Physical Therapy Treatment    Patient Name: Caroline Oh  MRN: [id number]  Today's Date: 10/23/2023   Visit 3/12  Referred by:  Sofia Hamilton  Time Calculation  Start Time: 1404  Stop Time: 1447  Time Calculation (min): 43 min  Diagnosis:   1. Status post right knee replacement        2. Right knee pain              SUBJECTIVE:  2/10 pain at the medial R knee.  She has only had tylenol once today.  She is moving better and able to go without her cane at times.  She is outside walking a little (100 ft).    HEP compliance: yes    OBJECTIVE:  - note thickness at the inferior aspect of the incision.  Otherwise healing well.  - AROM = 0 - 115    Treatment:  - continued with exercise for R knee ROM and strength.  increased reps as ambar.  Therapeutic Exercise  Therapeutic Exercise Activity 1: 4-way hip on table, 1 lb, 2 x10  Therapeutic Exercise Activity 2: NuStep L1, 5 min  Therapeutic Exercise Activity 3: squat 3x10  Therapeutic Exercise Activity 4: calf raises 3x10  Therapeutic Exercise Activity 5: R SLS 30 sec x2  Therapeutic Exercise Activity 6: LAQ 1 lb, 3x10  Therapeutic Exercise Activity 7: HS curl, GN TB, 3x10    Manual Therapy  Manual Therapy Activity 1: R knee flex mobs  Manual Therapy Activity 2: TFM to incision     ASSESSMENT:  Continues to demonstrate increased AROM.  Also reports decreased pain and improved ability to do ADLS such as walking.  Good tolerance to increased reps of exercise today.  She will benefit from continued therapy to further improve ROM and strength as well as scar tissue mobility for return to ADLS.    PLAN:  Increase exercise as ambar.

## 2023-10-26 ENCOUNTER — TREATMENT (OUTPATIENT)
Dept: PHYSICAL THERAPY | Facility: CLINIC | Age: 68
End: 2023-10-26
Payer: MEDICARE

## 2023-10-26 DIAGNOSIS — Z96.651 STATUS POST RIGHT KNEE REPLACEMENT: Primary | ICD-10-CM

## 2023-10-26 DIAGNOSIS — M25.561 RIGHT KNEE PAIN: ICD-10-CM

## 2023-10-26 PROCEDURE — 97140 MANUAL THERAPY 1/> REGIONS: CPT | Mod: GP | Performed by: PHYSICAL THERAPIST

## 2023-10-26 PROCEDURE — 97110 THERAPEUTIC EXERCISES: CPT | Mod: GP | Performed by: PHYSICAL THERAPIST

## 2023-10-26 NOTE — PROGRESS NOTES
Physical Therapy Treatment    [facility]  MRN[facility] [id number]  Today's Date: 10/26/2023   Visit 4/12  Referred by:  Sofia Hamilton  Time Calculation  Start Time: 1050  Stop Time: 1135  Time Calculation (min): 45 min  Diagnosis:   1. Status post right knee replacement  Follow Up In Physical Therapy      2. Right knee pain  Follow Up In Physical Therapy            SUBJECTIVE:  0/10 pain.  She reports increased pain and muscle soreness after last session, but this resolved.   Doesn't use the cane much at home.    HEP compliance: yes    OBJECTIVE:  - AROM = 0 - 105 at start of rx (increased to 115 of flex after manual work)  - good patellar mobility into sup/inf and med/lat dir  - inferior aspect of incision improved, but still have scar thickening.  - amb with proper gait in regard to R knee however she limps due to left knee OA (to have TKA in future).    Treatment:  - continued with exercise to improve ROM, WB, strength and gait.  Therapeutic Exercise  Therapeutic Exercise Activity 1: 4-way hip on table, 1 lb, 2 x10  Therapeutic Exercise Activity 2: NuStep L1, 5 min  Therapeutic Exercise Activity 3: squat 3x10  Therapeutic Exercise Activity 4: calf raises 3x10  Therapeutic Exercise Activity 5: R SLS 30 sec x2  Therapeutic Exercise Activity 6: LAQ 1 lb, 3x10  Therapeutic Exercise Activity 7: HS curl, GN TB, 3x10    Manual Therapy  Manual Therapy Activity 1: R knee flex mobs  Manual Therapy Activity 2: TFM to incision     ASSESSMENT:  Demonstrates increased ROM with rx compared to when she arrived, however it did not progress further compared to her last session.  Note improved scar tissue mobility at the inferior aspect after today's session.  She will benefit from continued therapy to increase knee strength and gain 5-10 degrees more of flexion.    PLAN:  Instructed to decreased cane use outside of her home  Increase exercise as ambar.

## 2023-10-30 ENCOUNTER — TREATMENT (OUTPATIENT)
Dept: PHYSICAL THERAPY | Facility: CLINIC | Age: 68
End: 2023-10-30
Payer: MEDICARE

## 2023-10-30 DIAGNOSIS — Z96.651 STATUS POST RIGHT KNEE REPLACEMENT: ICD-10-CM

## 2023-10-30 DIAGNOSIS — M25.561 RIGHT KNEE PAIN: ICD-10-CM

## 2023-10-30 PROCEDURE — 97110 THERAPEUTIC EXERCISES: CPT | Mod: GP | Performed by: PHYSICAL THERAPIST

## 2023-10-30 PROCEDURE — 97140 MANUAL THERAPY 1/> REGIONS: CPT | Mod: GP | Performed by: PHYSICAL THERAPIST

## 2023-10-30 NOTE — PROGRESS NOTES
Physical Therapy Treatment    Patient Name: Caroline Oh  MRN: [id number]  Today's Date: 10/30/2023   Visit 5/12  Referred by:  Sofia Hamilton  Time Calculation  Start Time: 1404  Stop Time: 1447  Time Calculation (min): 43 min  Diagnosis:   1. Status post right knee replacement  Follow Up In Physical Therapy      2. Right knee pain  Follow Up In Physical Therapy            SUBJECTIVE:  She isn't using her cane anymore.  She reports 1/10 stiffness at the R knee vs. Pain.   She is beginning to favor her R knee and notes it is getting easier to roll over in bed and she can put her sock/shoes on now.  She also feels her ROM is improving.    HEP compliance: yes.  She is also doing TFM to the incison    OBJECTIVE:  - AROM R knee = 0-116 (increased to 0-119 after manual work)  - note inferior aspect of incision is much more flat and smooth  - note improved gait with more WB on R LE.    Treatment:  - continued with exercise for strength.  Increased weight for LAQ and reps for 4-way hip.  Therapeutic Exercise  Therapeutic Exercise Activity 1: 4-way hip on table, 1 lb, 3 x10  Therapeutic Exercise Activity 2: NuStep L1, 5 min  Therapeutic Exercise Activity 3: squat 3x10  Therapeutic Exercise Activity 4: calf raises 3x10  Therapeutic Exercise Activity 5: R SLS 30 sec x2  Therapeutic Exercise Activity 6: LAQ 2 lb, 3x10  Therapeutic Exercise Activity 7: HS curl, GN TB, 3x10    Manual Therapy  Manual Therapy Activity 1: R knee flex mobs  Manual Therapy Activity 2: TFM to incision     ASSESSMENT:  Good ambar to advanced exercise - no pain.  She Demonstrates increase AROM and improved gait.  She will benefit from continued therapy to increase ROM and strength for return to ADLS.    PLAN:  Add step ups.

## 2023-11-01 ENCOUNTER — TREATMENT (OUTPATIENT)
Dept: PHYSICAL THERAPY | Facility: CLINIC | Age: 68
End: 2023-11-01
Payer: MEDICARE

## 2023-11-01 DIAGNOSIS — M25.561 RIGHT KNEE PAIN: ICD-10-CM

## 2023-11-01 DIAGNOSIS — Z96.651 STATUS POST RIGHT KNEE REPLACEMENT: ICD-10-CM

## 2023-11-01 PROCEDURE — 97110 THERAPEUTIC EXERCISES: CPT | Mod: GP | Performed by: PHYSICAL THERAPIST

## 2023-11-01 PROCEDURE — 97140 MANUAL THERAPY 1/> REGIONS: CPT | Mod: GP | Performed by: PHYSICAL THERAPIST

## 2023-11-01 NOTE — PROGRESS NOTES
Physical Therapy Treatment    Patient Name: Caroline Oh  MRN: [id number]  Today's Date: 11/1/2023   Visit 6/12  Referred by:   Sofia Hamilton  Time Calculation  Start Time: 1405  Stop Time: 1449  Time Calculation (min): 44 min  Diagnosis:   1. Status post right knee replacement  Follow Up In Physical Therapy      2. Right knee pain  Follow Up In Physical Therapy            SUBJECTIVE:  She notes she can do more activity around the house (clean the kitchen and cook) now.  She has 1/10 R medial knee pain that occurs intermittently.  HEP compliance: yes    OBJECTIVE:  - inferior aspect of incision is smooth and flat.  1 inch above the inferior aspect it is slightly thick.  - AROM = 0 -118    Treatment:  - continued with exercise for R knee ROM and strength.  Added step ups.  Therapeutic Exercise  Therapeutic Exercise Activity 1: 4-way hip on table, 1 lb, 3 x10  Therapeutic Exercise Activity 2: NuStep L1, 5 min  Therapeutic Exercise Activity 3: squat 3x10  Therapeutic Exercise Activity 4: calf raises 3x10  Therapeutic Exercise Activity 5: R SLS 30 sec x2  Therapeutic Exercise Activity 6: LAQ 2 lb, 3x10  Therapeutic Exercise Activity 7: HS curl, GN TB, 3x10  Therapeutic Exercise Activity 8: Step ups, R, 6 in,  Manual Therapy  Manual Therapy Activity 1: R knee flex mobs  Manual Therapy Activity 2: TFM to incision           ASSESSMENT:  Demonstrates good AROM of the R knee.  She ambar strength exercise well, including the new ones added today.  She is more limited by her L knee OA at this point than the R knee.  She will benefit from PT to gain end range flex (120-125 degrees) and increase strength.      PLAN:  Continue with strength exercises.

## 2023-11-06 ENCOUNTER — TREATMENT (OUTPATIENT)
Dept: PHYSICAL THERAPY | Facility: CLINIC | Age: 68
End: 2023-11-06
Payer: MEDICARE

## 2023-11-06 DIAGNOSIS — M25.561 RIGHT KNEE PAIN: ICD-10-CM

## 2023-11-06 DIAGNOSIS — Z96.651 STATUS POST RIGHT KNEE REPLACEMENT: ICD-10-CM

## 2023-11-06 PROCEDURE — 97140 MANUAL THERAPY 1/> REGIONS: CPT | Mod: GP | Performed by: PHYSICAL THERAPIST

## 2023-11-06 PROCEDURE — 97110 THERAPEUTIC EXERCISES: CPT | Mod: GP | Performed by: PHYSICAL THERAPIST

## 2023-11-06 NOTE — PROGRESS NOTES
Physical Therapy Treatment    Patient Name: Caroline Oh  MRN: [id number]  Today's Date: 11/6/2023   Visit 7/12  Referred by:   Sofia Hamilton  Time Calculation  Start Time: 1320  Stop Time: 1405  Time Calculation (min): 45 min  Diagnosis:   1. Status post right knee replacement  Follow Up In Physical Therapy      2. Right knee pain  Follow Up In Physical Therapy            SUBJECTIVE:  Her R knee is feeling better.  She reports stiffness with an ache vs. Pain.  She states she felt good after her last session though she did have some soreness.    HEP compliance: yes    OBJECTIVE:  - note intermittent scar thickening at the superior, middle and inferior aspect  - AROM = 0 -114 at start of session (0-120 after today's session)  - note she continues limp when walking but due to her L knee vs. R    Treatment:  - continued with exercise for R knee strength.  Therapeutic Exercise  Therapeutic Exercise Activity 1: 4-way hip on table, 1 lb, 3 x10  Therapeutic Exercise Activity 2: NuStep L1, 5 min  Therapeutic Exercise Activity 3: squat 3x10  Therapeutic Exercise Activity 4: calf raises 3x10  Therapeutic Exercise Activity 5: R SLS 30 sec x2  Therapeutic Exercise Activity 6: LAQ 2 lb, 3x10  Therapeutic Exercise Activity 7: HS curl, GN TB, 3x10  Therapeutic Exercise Activity 8: Step ups, R, 6 in, 3x10  Manual Therapy  Manual Therapy Activity 1: R knee flex mobs  Manual Therapy Activity 2: TFM to incision           ASSESSMENT:  She is progressing well in therapy.  She is able to amb without assistive device and her ROM is at 0-120.  She does struggle to reach end range, but it is improving.  There are a few areas of thickness in the incision, but this flattens out quickly with TFM.  Her primary focus at this point is improving strength for return to all ADLS.  She will benefit from continued therapy to increase strength for ADLS such as transfers and stair amb.    PLAN:  Continue with strength exercises.

## 2023-11-07 ENCOUNTER — OFFICE VISIT (OUTPATIENT)
Dept: ORTHOPEDIC SURGERY | Facility: CLINIC | Age: 68
End: 2023-11-07
Payer: MEDICARE

## 2023-11-07 VITALS — WEIGHT: 216 LBS | BODY MASS INDEX: 32.74 KG/M2 | HEIGHT: 68 IN

## 2023-11-07 DIAGNOSIS — M17.12 PRIMARY OSTEOARTHRITIS OF LEFT KNEE: ICD-10-CM

## 2023-11-07 DIAGNOSIS — M17.11 PRIMARY OSTEOARTHRITIS OF RIGHT KNEE: Primary | ICD-10-CM

## 2023-11-07 PROCEDURE — 3074F SYST BP LT 130 MM HG: CPT | Performed by: ORTHOPAEDIC SURGERY

## 2023-11-07 PROCEDURE — 1036F TOBACCO NON-USER: CPT | Performed by: ORTHOPAEDIC SURGERY

## 2023-11-07 PROCEDURE — 20610 DRAIN/INJ JOINT/BURSA W/O US: CPT | Performed by: ORTHOPAEDIC SURGERY

## 2023-11-07 PROCEDURE — 99213 OFFICE O/P EST LOW 20 MIN: CPT | Performed by: ORTHOPAEDIC SURGERY

## 2023-11-07 PROCEDURE — 1159F MED LIST DOCD IN RCRD: CPT | Performed by: ORTHOPAEDIC SURGERY

## 2023-11-07 PROCEDURE — 1160F RVW MEDS BY RX/DR IN RCRD: CPT | Performed by: ORTHOPAEDIC SURGERY

## 2023-11-07 PROCEDURE — 3079F DIAST BP 80-89 MM HG: CPT | Performed by: ORTHOPAEDIC SURGERY

## 2023-11-07 PROCEDURE — 99024 POSTOP FOLLOW-UP VISIT: CPT | Performed by: ORTHOPAEDIC SURGERY

## 2023-11-07 RX ORDER — TRIAMCINOLONE ACETONIDE 40 MG/ML
80 INJECTION, SUSPENSION INTRA-ARTICULAR; INTRAMUSCULAR
Status: COMPLETED | OUTPATIENT
Start: 2023-11-07 | End: 2023-11-07

## 2023-11-07 RX ADMIN — TRIAMCINOLONE ACETONIDE 80 MG: 40 INJECTION, SUSPENSION INTRA-ARTICULAR; INTRAMUSCULAR at 19:48

## 2023-11-07 ASSESSMENT — PAIN - FUNCTIONAL ASSESSMENT
PAIN_FUNCTIONAL_ASSESSMENT: NO/DENIES PAIN
PAIN_FUNCTIONAL_ASSESSMENT: NO/DENIES PAIN

## 2023-11-07 NOTE — PROGRESS NOTES
ORTHOPEDIC TOTAL JOINT  POST-OPERATIVE FOLLOW UP      ============================  IMPRESSION/PLAN:  ============================  68 y.o. female s/p Right Total Knee Replacement completed on 09/29/2023  History of right TKA  Primary osteoarthritis, left knee    PLAN:  -Weightbearing: Weight bearing instructions not necessary at this time  -DVT Prophylaxis: no longer needed  -Radiology Studies: none today  -Therapies: Continue PT/OT  -Follow-up: six weeks with xrays   - regarding left knee, ok to proceed with corticosteroid injection for pain control given good results in past. See procedure note. May repeat in 4 months if necessary.     L Inj/Asp: L knee on 11/7/2023 7:48 PM  Indications: pain  Details: 25 G needle (Inferolateral) approach  Medications: 80 mg triamcinolone acetonide 40 mg/mL    Prepped with alcohol. Patient tolerated injection well. Band-aid applied to injection site.   Procedure, treatment alternatives, risks and benefits explained, specific risks discussed. Consent was given by the patient. Immediately prior to procedure a time out was called to verify the correct patient, procedure, equipment, support staff and site/side marked as required.         Caroline Oh presents today for follow up of joint replacement above. Pain controlled with current treatment plan. Patient has begun physical therapy. They are currently doing therapy at [facility]. They are currently ambulating with  no assistance .   She states her left knee is bothering her more now.  She wants to try another injection for that knee.     Review of Systems:   Constitutional: See HPI for pain assessment, No significant weight loss, recent trauma. Denies fevers/chills  Cardiovascular: No chest pain, shortness of breath  Respiratory: No difficulty breathing, cough  Gastrointestinal: No nausea, vomiting, diarrhea, constipation  Musculoskeletal: Noted in HPI, no arthralgias   Integumentary: No rashes, easy bruising, redness    Neurological: no numbness or tingling in extremities, no gait disturbances     Patient Active Problem List   Diagnosis    Arthritis of knee    Benign essential hypertension    Hyperlipidemia    Hypertriglyceridemia    Medicare annual wellness visit, subsequent    Anxiety    Status post right knee replacement    Right knee pain       =================================  EXAM  =================================  GENERAL: A/Ox3, NAD. Appears healthy, well nourished  CARDIAC: regular rate  LUNGS: Breathing non-labored    MUSCULOSKELETAL:  Laterality: right   - Incision: No overlying, erythema, induration, or drainage. Incision healing well with no wound dehiscence  - ROM: 0 - 110 deg  - Palpation: appropriate post operative TTP along surgical incision  - compartments soft, negative homans  - can active straight leg raise with no extensor lag    Laterality: left Knee Exam  - Alignment: partially correctible varus deformity  - ROM:  5 - 115 deg   - Effusion: none  - Strength: knee extension and flexion 5/5, EHL/PF/DF motor intact  - Palpation: TTP along medial joint line  - Stability: Anterior/Posterior stable, varus/valgus stable  - Gait: normal  - Hip Exam: flexion to 100+ degrees, full extension, internal/external rotation adequate, and no pain with log roll  - Special Tests: none performed    NEUROVASCULAR:  - Neurovascular Status: sensation intact to light touch distally  - Capillary refill brisk at extremities, Bilateral dorsalis pedis pulse 2+       Sofia Hamilton DO  Attending Surgeon  Joint Replacement and Adult Reconstructive Surgery  [place], [place]

## 2023-11-09 ENCOUNTER — TREATMENT (OUTPATIENT)
Dept: PHYSICAL THERAPY | Facility: CLINIC | Age: 68
End: 2023-11-09
Payer: MEDICARE

## 2023-11-09 DIAGNOSIS — M25.561 RIGHT KNEE PAIN: ICD-10-CM

## 2023-11-09 DIAGNOSIS — Z96.651 STATUS POST RIGHT KNEE REPLACEMENT: ICD-10-CM

## 2023-11-09 PROCEDURE — 97110 THERAPEUTIC EXERCISES: CPT | Mod: GP | Performed by: PHYSICAL THERAPIST

## 2023-11-09 PROCEDURE — 97140 MANUAL THERAPY 1/> REGIONS: CPT | Mod: GP | Performed by: PHYSICAL THERAPIST

## 2023-11-09 NOTE — PROGRESS NOTES
Physical Therapy Treatment    Patient Name: Caroline Oh  MRN: [id number]  Today's Date: 11/9/2023   Visit 8/12  Referred by:   Sofia Hamilton  Time Calculation  Start Time: 1403  Stop Time: 1444  Time Calculation (min): 41 min  Diagnosis:   1. Status post right knee replacement  Follow Up In Physical Therapy      2. Right knee pain  Follow Up In Physical Therapy            SUBJECTIVE:  She was able to drive today.  She had some soreness after last rx but no pain.  0/10 R knee pain today.  She saw Dr. Hamilton and received an injection in the L knee which has helped.    HEP compliance: yes    OBJECTIVE:  - strong QS  - AROM = 0 - 120  - MMT R HS/Quad = 5/5    Treatment:  - continued with exercise for R knee strength.  Advanced to multi hip machine vs. 4-way hip on table.  Added side step with band.  Therapeutic Exercise  Therapeutic Exercise Activity 1: multi hip machine, L3, all dir, 2x12  Therapeutic Exercise Activity 2: NuStep L1, 5 min  Therapeutic Exercise Activity 3: squat 3x10  Therapeutic Exercise Activity 4: calf raises 3x10  Therapeutic Exercise Activity 5: R SLS 30 sec x2  Therapeutic Exercise Activity 6: LAQ 2 lb, 3x10  Therapeutic Exercise Activity 7: HS curl, GN TB, 3x10  Therapeutic Exercise Activity 8: Step ups, R, 6 in, 3x10  Therapeutic Exercise Activity 9: side step with GN TB, 15 ft x4  Manual Therapy  Manual Therapy Activity 1: R knee flex mobs  Manual Therapy Activity 2: TFM to incision             ASSESSMENT:  She was challenged but tolerated the new exercises well in regard to her R knee.  Her L knee however did have pain particularly when in WB positions.  She is progressing well and I anticipate discharge soon due to her progress.    PLAN:  Continue with strength exercises.

## 2023-11-13 ENCOUNTER — TREATMENT (OUTPATIENT)
Dept: PHYSICAL THERAPY | Facility: CLINIC | Age: 68
End: 2023-11-13
Payer: MEDICARE

## 2023-11-13 DIAGNOSIS — Z96.651 STATUS POST RIGHT KNEE REPLACEMENT: Primary | ICD-10-CM

## 2023-11-13 DIAGNOSIS — M25.561 RIGHT KNEE PAIN: ICD-10-CM

## 2023-11-13 PROCEDURE — 97110 THERAPEUTIC EXERCISES: CPT | Mod: GP | Performed by: PHYSICAL THERAPIST

## 2023-11-13 PROCEDURE — 97140 MANUAL THERAPY 1/> REGIONS: CPT | Mod: GP | Performed by: PHYSICAL THERAPIST

## 2023-11-13 NOTE — PROGRESS NOTES
Physical Therapy Treatment    [facility]  MRN: [id number]  Today's Date: 11/13/2023   Visit 9/12  Referred by:  Sofia Hamilton  Time Calculation  Start Time: 1303  Stop Time: 1341  Time Calculation (min): 38 min  Diagnosis:   1. Status post right knee replacement  Follow Up In Physical Therapy      2. Right knee pain  Follow Up In Physical Therapy            SUBJECTIVE:  Her R knee is doing well.  0/10 pain though she is still aware of it at times.  The L knee is the primary issue.  She was tired after her last session but no pain.  HEP compliance: yes    OBJECTIVE:  - 2 areas of thickness at the superior incision, but otherwise it has good mobility and is flat  -  AROM = 0 - 115 (at start of session)  - MMT HS and quad = 4+/5    Treatment:  - continued with exercises to improve R knee strength.  Therapeutic Exercise  Therapeutic Exercise Activity 1: multi hip machine, L3, all dir, 2x12  Therapeutic Exercise Activity 2: NuStep L1, 5 min  Therapeutic Exercise Activity 3: squat 3x10  Therapeutic Exercise Activity 4: calf raises 3x10  Therapeutic Exercise Activity 5: R SLS 30 sec x2  Therapeutic Exercise Activity 6: LAQ 3 lb, 3x10  Therapeutic Exercise Activity 7: HS curl, GN TB, 3x10  Therapeutic Exercise Activity 8: Step ups, R, 6 in, 3x10  Therapeutic Exercise Activity 9: side step with GN TB, 15 ft x4  Therapeutic Exercise Activity 10: bridge 3x12  Manual Therapy  Manual Therapy Activity 1: R knee flex mobs  Manual Therapy Activity 2: TFM to incision             ASSESSMENT:  Good tolerance to exercises.  No R knee pain, but the L does hurt at times.  AROM increased to 0 - 117 after rx.  She will benefit from continued therapy to increase strength.    PLAN:  Anticipate discharge soon due to progress.

## 2023-11-16 ENCOUNTER — TREATMENT (OUTPATIENT)
Dept: PHYSICAL THERAPY | Facility: CLINIC | Age: 68
End: 2023-11-16
Payer: MEDICARE

## 2023-11-16 DIAGNOSIS — M25.561 RIGHT KNEE PAIN: ICD-10-CM

## 2023-11-16 DIAGNOSIS — Z96.651 STATUS POST RIGHT KNEE REPLACEMENT: Primary | ICD-10-CM

## 2023-11-16 PROCEDURE — 97110 THERAPEUTIC EXERCISES: CPT | Mod: GP | Performed by: PHYSICAL THERAPIST

## 2023-11-16 NOTE — PROGRESS NOTES
Physical Therapy Treatment    [facility]  MRN: [id number]  Today's Date: 11/16/2023   Visit 10/12  Referred by:   Sofia Hamilton  Time Calculation  Start Time: 1404  Stop Time: 1445  Time Calculation (min): 41 min  Diagnosis:   1. Status post right knee replacement  Follow Up In Physical Therapy      2. Right knee pain  Follow Up In Physical Therapy            SUBJECTIVE:  0/10 pain at the R knee.  It feels strong and with good movement.  She did well after last session. The L knee is most limiting factor now due to OA for which she needs a TKA.  She ascends stairs with reciprocal gait, but descends with step to gait.    HEP compliance: yes    OBJECTIVE:  - MMT HS and quad = 5/5 R.  R hip abd = 4/5  - R knee AROM = 0 - 120  - some thickening of scar tissue at superior aspect  - amb with a slight limp due to L knee vs R  - LEFS score = 50 ( 18 at eval)    Treatment:  - continued with strength exercise.  Added step down to improve gait when descending stairs.  Therapeutic Exercise  Therapeutic Exercise Activity 1: multi hip machine, L3, all dir, 2x12  Therapeutic Exercise Activity 2: NuStep L1, 5 min  Therapeutic Exercise Activity 3: squat 3x10  Therapeutic Exercise Activity 4: calf raises 3x10  Therapeutic Exercise Activity 5: R SLS 30 sec x2  Therapeutic Exercise Activity 6: LAQ 3 lb, 3x10  Therapeutic Exercise Activity 7: HS curl, GN TB, 3x10  Therapeutic Exercise Activity 8: Step ups, R, 6 in, 3x10  Therapeutic Exercise Activity 9: side step with GN TB, 15 ft x4  Therapeutic Exercise Activity 10: bridge 3x12  Therapeutic Exercise Activity 11: step down R, 2x10, 6 in              - she was given a HEP to continue independently as seen below    Access Code: [email or web address]  URL: [email or web address]  Date: 11/16/2023  Prepared by: Reina Arango    Exercises  - Supine Bridge  - 1 x daily - 4 x weekly - 3 sets - 10 reps  - Standing Hip Abduction with Anchored Resistance  - 1 x  daily - 4 x weekly - 3 sets - 10 reps  - Standing Hip Extension with Anchored Resistance  - 1 x daily - 4 x weekly - 3 sets - 10 reps  - Standing Hip Adduction with Anchored Resistance  - 1 x daily - 4 x weekly - 3 sets - 10 reps  - Standing Hip Flexion with Anchored Resistance and Chair Support  - 1 x daily - 4 x weekly - 3 sets - 10 reps  - Squat with Chair Touch  - 1 x daily - 4 x weekly - 3 sets - 10 reps  - Standing Heel Raises  - 1 x daily - 4 x weekly - 3 sets - 10 reps  - Single Leg Stance  - 1 x daily - 4 x weekly - 1 sets - 3 reps - 30 hold  - Step Up  - 1 x daily - 4 x weekly - 3 sets - 10 reps  - Seated Hamstring Curl with Anchored Resistance  - 1 x daily - 4 x weekly - 3 sets - 10 reps  - Sitting Knee Extension with Resistance  - 1 x daily - 4 x weekly - 3 sets - 10 reps  - Forward Step Down  - 1 x daily - 4 x weekly - 3 sets - 10 reps    ASSESSMENT:  Good tolerance of exercises.  Challenged by step downs and needed cues for form. She has Met all her goals in regard to her R knee though she is limited with some ADLS due to L knee OA.    PLAN:  Discharge to [facility]

## 2023-11-22 ENCOUNTER — APPOINTMENT (OUTPATIENT)
Dept: PHYSICAL THERAPY | Facility: CLINIC | Age: 68
End: 2023-11-22
Payer: MEDICARE

## 2023-11-24 ENCOUNTER — APPOINTMENT (OUTPATIENT)
Dept: PHYSICAL THERAPY | Facility: CLINIC | Age: 68
End: 2023-11-24
Payer: MEDICARE

## 2023-11-28 ENCOUNTER — TELEPHONE (OUTPATIENT)
Dept: ORTHOPEDIC SURGERY | Facility: CLINIC | Age: 68
End: 2023-11-28
Payer: MEDICARE

## 2023-11-28 DIAGNOSIS — M17.11 PRIMARY OSTEOARTHRITIS OF RIGHT KNEE: Primary | ICD-10-CM

## 2023-11-28 RX ORDER — AMOXICILLIN 500 MG/1
2000 CAPSULE ORAL SEE ADMIN INSTRUCTIONS
Qty: 20 CAPSULE | Refills: 0 | Status: SHIPPED | OUTPATIENT
Start: 2023-11-28 | End: 2024-03-13 | Stop reason: ALTCHOICE

## 2023-11-28 NOTE — TELEPHONE ENCOUNTER
Patient called in stating she R TKA done 9/29/23, states she has a tooth ache and needs to go to dentist and would like to know what the protocol is? Please advise

## 2023-12-06 ENCOUNTER — TELEPHONE (OUTPATIENT)
Dept: PRIMARY CARE | Facility: CLINIC | Age: 68
End: 2023-12-06
Payer: MEDICARE

## 2023-12-13 ENCOUNTER — OFFICE VISIT (OUTPATIENT)
Dept: PRIMARY CARE | Facility: CLINIC | Age: 68
End: 2023-12-13
Payer: MEDICARE

## 2023-12-13 VITALS
OXYGEN SATURATION: 96 % | WEIGHT: 210.8 LBS | HEIGHT: 68 IN | RESPIRATION RATE: 18 BRPM | SYSTOLIC BLOOD PRESSURE: 128 MMHG | BODY MASS INDEX: 31.95 KG/M2 | DIASTOLIC BLOOD PRESSURE: 72 MMHG | HEART RATE: 95 BPM

## 2023-12-13 DIAGNOSIS — F41.9 ANXIETY: Primary | ICD-10-CM

## 2023-12-13 DIAGNOSIS — K58.2 IRRITABLE BOWEL SYNDROME WITH BOTH CONSTIPATION AND DIARRHEA: ICD-10-CM

## 2023-12-13 DIAGNOSIS — I10 BENIGN ESSENTIAL HYPERTENSION: ICD-10-CM

## 2023-12-13 DIAGNOSIS — Z12.11 COLON CANCER SCREENING: ICD-10-CM

## 2023-12-13 DIAGNOSIS — M17.10 ARTHRITIS OF KNEE: ICD-10-CM

## 2023-12-13 PROCEDURE — 1159F MED LIST DOCD IN RCRD: CPT | Performed by: FAMILY MEDICINE

## 2023-12-13 PROCEDURE — 1036F TOBACCO NON-USER: CPT | Performed by: FAMILY MEDICINE

## 2023-12-13 PROCEDURE — 99214 OFFICE O/P EST MOD 30 MIN: CPT | Performed by: FAMILY MEDICINE

## 2023-12-13 PROCEDURE — 3074F SYST BP LT 130 MM HG: CPT | Performed by: FAMILY MEDICINE

## 2023-12-13 PROCEDURE — 1160F RVW MEDS BY RX/DR IN RCRD: CPT | Performed by: FAMILY MEDICINE

## 2023-12-13 PROCEDURE — 3078F DIAST BP <80 MM HG: CPT | Performed by: FAMILY MEDICINE

## 2023-12-13 RX ORDER — CELECOXIB 200 MG/1
200 CAPSULE ORAL DAILY PRN
Qty: 90 CAPSULE | Refills: 1 | Status: SHIPPED | OUTPATIENT
Start: 2023-12-13 | End: 2024-04-11 | Stop reason: ALTCHOICE

## 2023-12-13 RX ORDER — HYDROXYZINE HYDROCHLORIDE 25 MG/1
25 TABLET, FILM COATED ORAL 2 TIMES DAILY
Qty: 60 TABLET | Refills: 0 | Status: SHIPPED | OUTPATIENT
Start: 2023-12-13 | End: 2024-03-13 | Stop reason: ALTCHOICE

## 2023-12-13 RX ORDER — VALSARTAN AND HYDROCHLOROTHIAZIDE 160; 12.5 MG/1; MG/1
1 TABLET, FILM COATED ORAL DAILY
Qty: 90 TABLET | Refills: 1 | Status: SHIPPED | OUTPATIENT
Start: 2023-12-13 | End: 2024-03-13 | Stop reason: SDUPTHER

## 2023-12-13 RX ORDER — DICYCLOMINE HYDROCHLORIDE 10 MG/1
10 CAPSULE ORAL 4 TIMES DAILY PRN
Qty: 360 CAPSULE | Refills: 1 | Status: SHIPPED | OUTPATIENT
Start: 2023-12-13 | End: 2024-03-13 | Stop reason: ALTCHOICE

## 2023-12-13 ASSESSMENT — PATIENT HEALTH QUESTIONNAIRE - PHQ9
SUM OF ALL RESPONSES TO PHQ9 QUESTIONS 1 AND 2: 0
2. FEELING DOWN, DEPRESSED OR HOPELESS: NOT AT ALL
1. LITTLE INTEREST OR PLEASURE IN DOING THINGS: NOT AT ALL

## 2023-12-13 ASSESSMENT — ENCOUNTER SYMPTOMS
DEPRESSION: 0
OCCASIONAL FEELINGS OF UNSTEADINESS: 0
LOSS OF SENSATION IN FEET: 0

## 2023-12-13 ASSESSMENT — COLUMBIA-SUICIDE SEVERITY RATING SCALE - C-SSRS
2. HAVE YOU ACTUALLY HAD ANY THOUGHTS OF KILLING YOURSELF?: NO
6. HAVE YOU EVER DONE ANYTHING, STARTED TO DO ANYTHING, OR PREPARED TO DO ANYTHING TO END YOUR LIFE?: NO
1. IN THE PAST MONTH, HAVE YOU WISHED YOU WERE DEAD OR WISHED YOU COULD GO TO SLEEP AND NOT WAKE UP?: NO

## 2023-12-13 NOTE — PROGRESS NOTES
"Subjective   Patient ID: Iqra Oh is a 68 y.o. female who presents for Follow-up.    HPI     Review of Systems   All other systems reviewed and are negative.  Pt needs cologard Bp is good  Pt denies cp, sob,edema, dizziness  Having ibs d would like meds   Has mucous  No melena  Objective   /72 (BP Location: Left arm, Patient Position: Sitting, BP Cuff Size: Large adult)   Pulse 95   Resp 18   Ht 1.727 m (5' 8\")   Wt 95.6 kg (210 lb 12.8 oz)   SpO2 96%   BMI 32.05 kg/m²     Physical Exam  Constitutional:       Appearance: Normal appearance.   HENT:      Head: Normocephalic and atraumatic.      Right Ear: Tympanic membrane, ear canal and external ear normal.      Left Ear: Tympanic membrane, ear canal and external ear normal.      Nose: Nose normal.      Mouth/Throat:      Mouth: Mucous membranes are moist.      Pharynx: Oropharynx is clear.   Eyes:      Extraocular Movements: Extraocular movements intact.      Conjunctiva/sclera: Conjunctivae normal.      Pupils: Pupils are equal, round, and reactive to light.   Cardiovascular:      Rate and Rhythm: Normal rate and regular rhythm.      Pulses: Normal pulses.      Heart sounds: Normal heart sounds.   Pulmonary:      Effort: Pulmonary effort is normal.      Breath sounds: Normal breath sounds.   Abdominal:      General: Abdomen is flat. Bowel sounds are normal.      Palpations: Abdomen is soft.   Genitourinary:     Comments: nl  Musculoskeletal:         General: Normal range of motion.      Cervical back: Normal range of motion and neck supple.   Skin:     General: Skin is warm and dry.      Capillary Refill: Capillary refill takes less than 2 seconds.   Neurological:      General: No focal deficit present.      Mental Status: She is alert and oriented to person, place, and time.   Psychiatric:         Mood and Affect: Mood normal.         Behavior: Behavior normal.         Thought Content: Thought content normal.         Assessment/Plan "   Diagnoses and all orders for this visit:  Anxiety  -     hydrOXYzine HCL (Atarax) 25 mg tablet; Take 1 tablet (25 mg) by mouth 2 times a day.  Benign essential hypertension  -     valsartan-hydrochlorothiazide (Diovan-HCT) 160-12.5 mg tablet; Take 1 tablet by mouth once daily.  -     Follow Up In [facility] - PCP; Future  Arthritis of knee  -     celecoxib (CeleBREX) 200 mg capsule; Take 1 capsule (200 mg) by mouth once daily as needed for mild pain (1 - 3).  Colon cancer screening  -     Cologuard® colon cancer screening; Future  Irritable bowel syndrome with both constipation and diarrhea  -     dicyclomine (Bentyl) 10 mg capsule; Take 1 capsule (10 mg) by mouth 4 times a day as needed (abdominal pain or cramps).

## 2023-12-28 ENCOUNTER — APPOINTMENT (OUTPATIENT)
Dept: ORTHOPEDIC SURGERY | Facility: CLINIC | Age: 68
End: 2023-12-28
Payer: MEDICARE

## 2024-01-02 ENCOUNTER — ANCILLARY PROCEDURE (OUTPATIENT)
Dept: RADIOLOGY | Facility: CLINIC | Age: 69
End: 2024-01-02
Payer: MEDICARE

## 2024-01-02 DIAGNOSIS — M17.11 PRIMARY OSTEOARTHRITIS OF RIGHT KNEE: ICD-10-CM

## 2024-01-02 PROCEDURE — 73560 X-RAY EXAM OF KNEE 1 OR 2: CPT | Mod: RT

## 2024-01-02 PROCEDURE — 73560 X-RAY EXAM OF KNEE 1 OR 2: CPT | Mod: RIGHT SIDE | Performed by: RADIOLOGY

## 2024-01-04 ENCOUNTER — OFFICE VISIT (OUTPATIENT)
Dept: ORTHOPEDIC SURGERY | Facility: CLINIC | Age: 69
End: 2024-01-04
Payer: MEDICARE

## 2024-01-04 VITALS — HEIGHT: 68 IN | BODY MASS INDEX: 31.83 KG/M2 | WEIGHT: 210 LBS

## 2024-01-04 DIAGNOSIS — M17.11 PRIMARY OSTEOARTHRITIS OF RIGHT KNEE: ICD-10-CM

## 2024-01-04 PROCEDURE — 1159F MED LIST DOCD IN RCRD: CPT | Performed by: ORTHOPAEDIC SURGERY

## 2024-01-04 PROCEDURE — 99024 POSTOP FOLLOW-UP VISIT: CPT | Performed by: ORTHOPAEDIC SURGERY

## 2024-01-04 PROCEDURE — 1036F TOBACCO NON-USER: CPT | Performed by: ORTHOPAEDIC SURGERY

## 2024-01-04 NOTE — PROGRESS NOTES
ORTHOPEDIC TOTAL JOINT  POST-OPERATIVE FOLLOW UP      ============================  IMPRESSION/PLAN:  ============================  68 y.o. female s/p Right Total Knee Replacement completed on 09/29/2023    PLAN:  -Weightbearing: Weight bearing instructions not necessary at this time  -DVT Prophylaxis: no longer needed  -Radiology Studies: X-rays from today reviewed with the patient demonstrating stable implants  -Therapies: continue home exercise program  -Follow-up: 1 year venancio of surgery with x-rays      Procedures  Caroline COY Adriano presents today for follow up of joint replacement above.  Patient is 3 months out from a right knee replacement overall is doing well.  Continues to notice improvement.  Ambulates no assistive device.  Done with physical therapy and working out on her own    Review of Systems:   Constitutional: See HPI for pain assessment, No significant weight loss, recent trauma. Denies fevers/chills  Cardiovascular: No chest pain, shortness of breath  Respiratory: No difficulty breathing, cough  Gastrointestinal: No nausea, vomiting, diarrhea, constipation  Musculoskeletal: Noted in HPI, no arthralgias   Integumentary: No rashes, easy bruising, redness   Neurological: no numbness or tingling in extremities, no gait disturbances     Patient Active Problem List   Diagnosis    Arthritis of knee    Benign essential hypertension    Hyperlipidemia    Hypertriglyceridemia    Colon cancer screening    Anxiety    Status post right knee replacement    Right knee pain       =================================  EXAM  =================================  GENERAL: A/Ox3, NAD. Appears healthy, well nourished  CARDIAC: regular rate  LUNGS: Breathing non-labored    MUSCULOSKELETAL:  Laterality: right knee  - Incision: No overlying, erythema, induration, or drainage. Incision healing well with no wound dehiscence  - ROM: 0 - 115 deg  - Palpation: appropriate post operative TTP along surgical incision  - compartments  soft, negative homans  - can active straight leg raise with no extensor lag    NEUROVASCULAR:  - Neurovascular Status: sensation intact to light touch distally  - Capillary refill brisk at extremities, Bilateral dorsalis pedis pulse 2+     Imagin views right knee demonstrate no signs of loosening failure of right total knee arthroplasty. X-rays were personally reviewed by me.  Radiology reports were reviewed by me as well, if available at the time.        Sofia Hamilton DO  Attending Surgeon  Joint Replacement and Adult Reconstructive Surgery  [place], [place]

## 2024-01-06 LAB — NONINV COLON CA DNA+OCC BLD SCRN STL QL: NEGATIVE

## 2024-03-13 ENCOUNTER — OFFICE VISIT (OUTPATIENT)
Dept: PRIMARY CARE | Facility: CLINIC | Age: 69
End: 2024-03-13
Payer: MEDICARE

## 2024-03-13 VITALS
DIASTOLIC BLOOD PRESSURE: 70 MMHG | BODY MASS INDEX: 32.28 KG/M2 | HEART RATE: 77 BPM | HEIGHT: 68 IN | OXYGEN SATURATION: 96 % | WEIGHT: 213 LBS | SYSTOLIC BLOOD PRESSURE: 136 MMHG

## 2024-03-13 DIAGNOSIS — K58.2 IRRITABLE BOWEL SYNDROME WITH BOTH CONSTIPATION AND DIARRHEA: ICD-10-CM

## 2024-03-13 DIAGNOSIS — I10 BENIGN ESSENTIAL HYPERTENSION: ICD-10-CM

## 2024-03-13 DIAGNOSIS — Z00.00 ROUTINE GENERAL MEDICAL EXAMINATION AT HEALTH CARE FACILITY: ICD-10-CM

## 2024-03-13 DIAGNOSIS — Z00.00 MEDICARE ANNUAL WELLNESS VISIT, SUBSEQUENT: Primary | ICD-10-CM

## 2024-03-13 DIAGNOSIS — E78.00 PURE HYPERCHOLESTEROLEMIA: ICD-10-CM

## 2024-03-13 DIAGNOSIS — F41.9 ANXIETY: ICD-10-CM

## 2024-03-13 DIAGNOSIS — M17.10 ARTHRITIS OF KNEE: ICD-10-CM

## 2024-03-13 DIAGNOSIS — Z12.31 SCREENING MAMMOGRAM FOR BREAST CANCER: ICD-10-CM

## 2024-03-13 PROCEDURE — 1159F MED LIST DOCD IN RCRD: CPT | Performed by: FAMILY MEDICINE

## 2024-03-13 PROCEDURE — 99397 PER PM REEVAL EST PAT 65+ YR: CPT | Performed by: FAMILY MEDICINE

## 2024-03-13 PROCEDURE — 3078F DIAST BP <80 MM HG: CPT | Performed by: FAMILY MEDICINE

## 2024-03-13 PROCEDURE — 1170F FXNL STATUS ASSESSED: CPT | Performed by: FAMILY MEDICINE

## 2024-03-13 PROCEDURE — 1158F ADVNC CARE PLAN TLK DOCD: CPT | Performed by: FAMILY MEDICINE

## 2024-03-13 PROCEDURE — G0439 PPPS, SUBSEQ VISIT: HCPCS | Performed by: FAMILY MEDICINE

## 2024-03-13 PROCEDURE — 1036F TOBACCO NON-USER: CPT | Performed by: FAMILY MEDICINE

## 2024-03-13 PROCEDURE — 3075F SYST BP GE 130 - 139MM HG: CPT | Performed by: FAMILY MEDICINE

## 2024-03-13 PROCEDURE — 99214 OFFICE O/P EST MOD 30 MIN: CPT | Performed by: FAMILY MEDICINE

## 2024-03-13 PROCEDURE — 1123F ACP DISCUSS/DSCN MKR DOCD: CPT | Performed by: FAMILY MEDICINE

## 2024-03-13 PROCEDURE — 1160F RVW MEDS BY RX/DR IN RCRD: CPT | Performed by: FAMILY MEDICINE

## 2024-03-13 RX ORDER — VALSARTAN AND HYDROCHLOROTHIAZIDE 160; 12.5 MG/1; MG/1
1 TABLET, FILM COATED ORAL DAILY
Qty: 90 TABLET | Refills: 1 | Status: SHIPPED | OUTPATIENT
Start: 2024-03-13

## 2024-03-13 RX ORDER — DICYCLOMINE HYDROCHLORIDE 20 MG/1
20 TABLET ORAL 4 TIMES DAILY PRN
Qty: 360 TABLET | Refills: 1 | Status: SHIPPED | OUTPATIENT
Start: 2024-03-13 | End: 2024-09-09

## 2024-03-13 ASSESSMENT — ENCOUNTER SYMPTOMS
OCCASIONAL FEELINGS OF UNSTEADINESS: 0
DEPRESSION: 0
LOSS OF SENSATION IN FEET: 0

## 2024-03-13 ASSESSMENT — ACTIVITIES OF DAILY LIVING (ADL)
GROCERY_SHOPPING: INDEPENDENT
TAKING_MEDICATION: INDEPENDENT
BATHING: INDEPENDENT
MANAGING_FINANCES: INDEPENDENT
DRESSING: INDEPENDENT
DOING_HOUSEWORK: INDEPENDENT

## 2024-03-13 ASSESSMENT — PATIENT HEALTH QUESTIONNAIRE - PHQ9
SUM OF ALL RESPONSES TO PHQ9 QUESTIONS 1 AND 2: 0
1. LITTLE INTEREST OR PLEASURE IN DOING THINGS: NOT AT ALL
2. FEELING DOWN, DEPRESSED OR HOPELESS: NOT AT ALL

## 2024-03-13 NOTE — PROGRESS NOTES
"Subjective   Reason for Visit: Caroline Oh is an 68 y.o. female here for a Medicare Wellness visit.     Past Medical, Surgical, and Family History reviewed and updated in chart.    Reviewed all medications by prescribing practitioner or clinical pharmacist (such as prescriptions, OTCs, herbal therapies and supplements) and documented in the medical record.    HPI    Patient Care Team:  Kajal Chery DO as PCP - General (Family Medicine)  Kajal Chery DO as PCP - [employer] PCP  Kajal Chery DO as Primary Care Provider     Review of Systems    Objective   Vitals:  /70   Pulse 77   Ht 1.727 m (5' 8\")   Wt 96.6 kg (213 lb)   SpO2 96%   BMI 32.39 kg/m²       Physical Exam    Assessment/Plan   Problem List Items Addressed This Visit     Benign essential hypertension    Relevant Orders    TSH with reflex to Free T4 if abnormal    Lipid Panel    Comprehensive Metabolic Panel    CBC and Auto Differential    Hyperlipidemia    Relevant Orders    TSH with reflex to Free T4 if abnormal    Lipid Panel    Comprehensive Metabolic Panel    CBC and Auto Differential    Anxiety    Relevant Orders    TSH with reflex to Free T4 if abnormal    Lipid Panel    Comprehensive Metabolic Panel    CBC and Auto Differential   Other Visit Diagnoses     Routine general medical examination at health care facility    -  Primary             "

## 2024-03-13 NOTE — PROGRESS NOTES
"Subjective   Patient ID: Iqra Oh is a 68 y.o. female who presents for Medicare Annual Wellness Visit Subsequent ([facility]).    HPI   Pt is doing well  Some l knee pain  Pt denies cp, sob,edema  Bp is good  Some IBS sx  Needs labs  Review of Systems    Objective   /70   Pulse 77   Ht 1.727 m (5' 8\")   Wt 96.6 kg (213 lb)   SpO2 96%   BMI 32.39 kg/m²     Physical Exam  Constitutional:       Appearance: Normal appearance.   HENT:      Head: Normocephalic and atraumatic.      Right Ear: Tympanic membrane, ear canal and external ear normal.      Left Ear: Tympanic membrane, ear canal and external ear normal.      Nose: Nose normal.      Mouth/Throat:      Mouth: Mucous membranes are moist.      Pharynx: Oropharynx is clear.   Eyes:      Extraocular Movements: Extraocular movements intact.      Conjunctiva/sclera: Conjunctivae normal.      Pupils: Pupils are equal, round, and reactive to light.   Cardiovascular:      Rate and Rhythm: Normal rate and regular rhythm.      Pulses: Normal pulses.      Heart sounds: Normal heart sounds.   Pulmonary:      Effort: Pulmonary effort is normal.      Breath sounds: Normal breath sounds.   Abdominal:      General: Abdomen is flat. Bowel sounds are normal.      Palpations: Abdomen is soft.   Genitourinary:     Comments: nl  Musculoskeletal:         General: Normal range of motion.      Cervical back: Normal range of motion and neck supple.   Skin:     General: Skin is warm and dry.      Capillary Refill: Capillary refill takes less than 2 seconds.   Neurological:      General: No focal deficit present.      Mental Status: She is alert and oriented to person, place, and time.   Psychiatric:         Mood and Affect: Mood normal.         Behavior: Behavior normal.         Thought Content: Thought content normal.         Assessment/Plan   Diagnoses and all orders for this visit:  Medicare annual wellness visit, subsequent  Benign essential hypertension  -     Follow Up In " [facility] - PCP  -     TSH with reflex to Free T4 if abnormal; Future  -     Lipid Panel; Future  -     Comprehensive Metabolic Panel; Future  -     CBC and Auto Differential; Future  -     valsartan-hydrochlorothiazide (Diovan-HCT) 160-12.5 mg tablet; Take 1 tablet by mouth once daily.  Pure hypercholesterolemia  -     TSH with reflex to Free T4 if abnormal; Future  -     Lipid Panel; Future  -     Comprehensive Metabolic Panel; Future  -     CBC and Auto Differential; Future  Anxiety  -     TSH with reflex to Free T4 if abnormal; Future  -     Lipid Panel; Future  -     Comprehensive Metabolic Panel; Future  -     CBC and Auto Differential; Future  Routine general medical examination at health care facility  Arthritis of knee  Irritable bowel syndrome with both constipation and diarrhea  -     dicyclomine (Bentyl) 20 mg tablet; Take 1 tablet (20 mg) by mouth 4 times a day as needed (abdominal pain or cramps).  Screening mammogram for breast cancer  -     BI mammo bilateral screening tomosynthesis; Future       Medicare Wellness Billing Compliance Satisfied    *This is a visual tool to show completion of required items on the day of the visit. Green checks will only appear on the date of visit.    Review all medications by prescribing practitioner or clinical pharmacist (such as prescriptions, OTCs, herbal therapies and supplements) documented in the medical record    Past Medical, Surgical, and Family History reviewed and updated in chart    Tobacco Use Reviewed    Alcohol Use Reviewed    Illicit Drug Use Reviewed    PHQ2/9    Falls in Last Year Reviewed    Home Safety Risk Factors Reviewed    Cognitive Impairment Reviewed    Patient Self Assessment and Health Status    Current Diet Reviewed    Exercise Frequency    ADL - Hearing Impairment    ADL - Bathing    ADL - Dressing    ADL - Walks in Home    IADL - Managing Finances    IADL - Grocery Shopping    IADL - Taking Medications     IADL - Doing Housework

## 2024-03-21 ENCOUNTER — LAB (OUTPATIENT)
Dept: LAB | Facility: LAB | Age: 69
End: 2024-03-21
Payer: MEDICARE

## 2024-03-21 DIAGNOSIS — E03.9 ACQUIRED HYPOTHYROIDISM: Primary | ICD-10-CM

## 2024-03-21 DIAGNOSIS — F41.9 ANXIETY: ICD-10-CM

## 2024-03-21 DIAGNOSIS — I10 BENIGN ESSENTIAL HYPERTENSION: ICD-10-CM

## 2024-03-21 DIAGNOSIS — E78.00 PURE HYPERCHOLESTEROLEMIA: ICD-10-CM

## 2024-03-21 LAB
ALBUMIN SERPL BCP-MCNC: 4.1 G/DL (ref 3.4–5)
ALP SERPL-CCNC: 63 U/L (ref 33–136)
ALT SERPL W P-5'-P-CCNC: 13 U/L (ref 7–45)
ANION GAP SERPL CALC-SCNC: 11 MMOL/L (ref 10–20)
AST SERPL W P-5'-P-CCNC: 15 U/L (ref 9–39)
BASOPHILS # BLD AUTO: 0.03 X10*3/UL (ref 0–0.1)
BASOPHILS NFR BLD AUTO: 0.5 %
BILIRUB SERPL-MCNC: 0.5 MG/DL (ref 0–1.2)
BUN SERPL-MCNC: 17 MG/DL (ref 6–23)
CALCIUM SERPL-MCNC: 9.7 MG/DL (ref 8.6–10.3)
CHLORIDE SERPL-SCNC: 104 MMOL/L (ref 98–107)
CHOLEST SERPL-MCNC: 247 MG/DL (ref 0–199)
CHOLESTEROL/HDL RATIO: 3.5
CO2 SERPL-SCNC: 30 MMOL/L (ref 21–32)
CREAT SERPL-MCNC: 0.92 MG/DL (ref 0.5–1.05)
EGFRCR SERPLBLD CKD-EPI 2021: 68 ML/MIN/1.73M*2
EOSINOPHIL # BLD AUTO: 0.11 X10*3/UL (ref 0–0.7)
EOSINOPHIL NFR BLD AUTO: 1.9 %
ERYTHROCYTE [DISTWIDTH] IN BLOOD BY AUTOMATED COUNT: 12.8 % (ref 11.5–14.5)
GLUCOSE SERPL-MCNC: 88 MG/DL (ref 74–99)
HCT VFR BLD AUTO: 41.8 % (ref 36–46)
HDLC SERPL-MCNC: 70.3 MG/DL
HGB BLD-MCNC: 14.2 G/DL (ref 12–16)
IMM GRANULOCYTES # BLD AUTO: 0.03 X10*3/UL (ref 0–0.7)
IMM GRANULOCYTES NFR BLD AUTO: 0.5 % (ref 0–0.9)
LDLC SERPL CALC-MCNC: 121 MG/DL
LYMPHOCYTES # BLD AUTO: 2.23 X10*3/UL (ref 1.2–4.8)
LYMPHOCYTES NFR BLD AUTO: 38.3 %
MCH RBC QN AUTO: 30.8 PG (ref 26–34)
MCHC RBC AUTO-ENTMCNC: 34 G/DL (ref 32–36)
MCV RBC AUTO: 91 FL (ref 80–100)
MONOCYTES # BLD AUTO: 0.45 X10*3/UL (ref 0.1–1)
MONOCYTES NFR BLD AUTO: 7.7 %
NEUTROPHILS # BLD AUTO: 2.98 X10*3/UL (ref 1.2–7.7)
NEUTROPHILS NFR BLD AUTO: 51.1 %
NON HDL CHOLESTEROL: 177 MG/DL (ref 0–149)
NRBC BLD-RTO: 0 /100 WBCS (ref 0–0)
PLATELET # BLD AUTO: 283 X10*3/UL (ref 150–450)
POTASSIUM SERPL-SCNC: 4.2 MMOL/L (ref 3.5–5.3)
PROT SERPL-MCNC: 6.8 G/DL (ref 6.4–8.2)
RBC # BLD AUTO: 4.61 X10*6/UL (ref 4–5.2)
SODIUM SERPL-SCNC: 141 MMOL/L (ref 136–145)
T4 FREE SERPL-MCNC: 0.95 NG/DL (ref 0.61–1.12)
TRIGL SERPL-MCNC: 280 MG/DL (ref 0–149)
TSH SERPL-ACNC: 4.14 MIU/L (ref 0.44–3.98)
VLDL: 56 MG/DL (ref 0–40)
WBC # BLD AUTO: 5.8 X10*3/UL (ref 4.4–11.3)

## 2024-03-21 PROCEDURE — 84443 ASSAY THYROID STIM HORMONE: CPT

## 2024-03-21 PROCEDURE — 80053 COMPREHEN METABOLIC PANEL: CPT

## 2024-03-21 PROCEDURE — 85025 COMPLETE CBC W/AUTO DIFF WBC: CPT

## 2024-03-21 PROCEDURE — 36415 COLL VENOUS BLD VENIPUNCTURE: CPT

## 2024-03-21 PROCEDURE — 80061 LIPID PANEL: CPT

## 2024-03-21 PROCEDURE — 84439 ASSAY OF FREE THYROXINE: CPT

## 2024-03-21 NOTE — RESULT ENCOUNTER NOTE
Thyroid is underactive I would like her to get thyroid us and set up a follow up  Chol is up but good or hdl is high and this protects her

## 2024-04-01 ENCOUNTER — HOSPITAL ENCOUNTER (OUTPATIENT)
Dept: RADIOLOGY | Facility: CLINIC | Age: 69
Discharge: HOME | End: 2024-04-01
Payer: MEDICARE

## 2024-04-01 DIAGNOSIS — E03.9 ACQUIRED HYPOTHYROIDISM: ICD-10-CM

## 2024-04-01 PROCEDURE — 76536 US EXAM OF HEAD AND NECK: CPT

## 2024-04-01 PROCEDURE — 76536 US EXAM OF HEAD AND NECK: CPT | Performed by: RADIOLOGY

## 2024-04-11 ENCOUNTER — OFFICE VISIT (OUTPATIENT)
Dept: PRIMARY CARE | Facility: CLINIC | Age: 69
End: 2024-04-11
Payer: MEDICARE

## 2024-04-11 VITALS
OXYGEN SATURATION: 97 % | BODY MASS INDEX: 32.84 KG/M2 | SYSTOLIC BLOOD PRESSURE: 130 MMHG | HEART RATE: 85 BPM | DIASTOLIC BLOOD PRESSURE: 78 MMHG | WEIGHT: 216 LBS

## 2024-04-11 DIAGNOSIS — E03.9 ACQUIRED HYPOTHYROIDISM: Primary | ICD-10-CM

## 2024-04-11 PROCEDURE — 1159F MED LIST DOCD IN RCRD: CPT | Performed by: FAMILY MEDICINE

## 2024-04-11 PROCEDURE — 3075F SYST BP GE 130 - 139MM HG: CPT | Performed by: FAMILY MEDICINE

## 2024-04-11 PROCEDURE — 1160F RVW MEDS BY RX/DR IN RCRD: CPT | Performed by: FAMILY MEDICINE

## 2024-04-11 PROCEDURE — 1123F ACP DISCUSS/DSCN MKR DOCD: CPT | Performed by: FAMILY MEDICINE

## 2024-04-11 PROCEDURE — 3078F DIAST BP <80 MM HG: CPT | Performed by: FAMILY MEDICINE

## 2024-04-11 PROCEDURE — 99214 OFFICE O/P EST MOD 30 MIN: CPT | Performed by: FAMILY MEDICINE

## 2024-04-11 PROCEDURE — 1036F TOBACCO NON-USER: CPT | Performed by: FAMILY MEDICINE

## 2024-04-11 RX ORDER — LEVOTHYROXINE SODIUM 25 UG/1
25 TABLET ORAL
Qty: 90 TABLET | Refills: 0 | Status: SHIPPED | OUTPATIENT
Start: 2024-04-11 | End: 2024-05-22

## 2024-04-11 RX ORDER — CEFDINIR 300 MG/1
300 CAPSULE ORAL 2 TIMES DAILY
Qty: 20 CAPSULE | Refills: 0 | Status: SHIPPED | OUTPATIENT
Start: 2024-04-11 | End: 2024-04-21

## 2024-04-11 ASSESSMENT — ENCOUNTER SYMPTOMS
OCCASIONAL FEELINGS OF UNSTEADINESS: 0
LOSS OF SENSATION IN FEET: 0
DEPRESSION: 0

## 2024-04-11 NOTE — PROGRESS NOTES
Subjective   Patient ID: Iqra Oh is a 68 y.o. female who presents for Follow-up (FROM [facility]).    Pt had elevated tsh  On labs  Has had some IBS sx     Had thyroid us  Benign nodules  Pt has fatigue wt gain  No dysphagia    Review of Systems   All other systems reviewed and are negative.      Objective   /90   Pulse 85   Wt 98 kg (216 lb)   SpO2 97%   BMI 32.84 kg/m²   /78   Pulse 85   Wt 98 kg (216 lb)   SpO2 97%   BMI 32.84 kg/m²     General Appearance:    Alert, cooperative, no distress, appears stated age   Head:    Normocephalic, without obvious abnormality, atraumatic   Eyes:    PERRL, conjunctiva/corneas clear, EOM's intact, fundi benign, both     eyes   Ears:    Normal TM's and external ear canals, both ears   Nose:   Nares normal, septum midline, mucosa normal, no drainage or sinus    tenderness   Throat:   Lips, mucosa, and tongue normal; teeth and gums normal   Neck:   Supple, symmetrical, trachea midline, no adenopathy; thyroid: no         enlargement/tenderness/nodules; no carotid bruit or JVD   Back:     Symmetric, no curvature, ROM normal, no CVA tenderness   Lungs:     Clear to auscultation bilaterally, respirations unlabored   Chest Wall:    No tenderness or deformity   Heart:    Regular rate and rhythm, S1 and S2 normal, no murmur, rub or gallop   Breast Exam:     Abdomen:     Soft, non-tender, bowel sounds active all four quadrants, no masses,   no organomegaly       Rectal:    Normal tone, no masses or tenderness; guaiac negative stool   Extremities:   Extremities normal, atraumatic, no cyanosis or edema   Pulses:   2+ and symmetric all extremities   Skin:   Skin color, texture, turgor normal, no rashes or lesions   Lymph nodes:   Cervical, supraclavicular, and axillary nodes normal   Neurologic:   CNII-XII intact, normal strength, sensation and reflexes throughout         Assessment/Plan   Diagnoses and all orders for this visit:  Acquired hypothyroidism  -      TSH with reflex to Free T4 if abnormal; Future  -     T4; Future  -     Thyroid Peroxidase (TPO) Antibody; Future  -     Synthroid 25 mcg tablet; Take 1 tablet (25 mcg) by mouth once daily in the morning. Take before meals.  -     cefdinir (Omnicef) 300 mg capsule; Take 1 capsule (300 mg) by mouth 2 times a day for 10 days.

## 2024-05-06 ENCOUNTER — HOSPITAL ENCOUNTER (OUTPATIENT)
Dept: RADIOLOGY | Facility: CLINIC | Age: 69
Discharge: HOME | End: 2024-05-06
Payer: MEDICARE

## 2024-05-06 ENCOUNTER — OFFICE VISIT (OUTPATIENT)
Dept: ORTHOPEDIC SURGERY | Facility: CLINIC | Age: 69
End: 2024-05-06
Payer: MEDICARE

## 2024-05-06 VITALS — BODY MASS INDEX: 32.74 KG/M2 | HEIGHT: 68 IN | WEIGHT: 216 LBS

## 2024-05-06 DIAGNOSIS — M17.12 PRIMARY OSTEOARTHRITIS OF LEFT KNEE: ICD-10-CM

## 2024-05-06 PROCEDURE — 99213 OFFICE O/P EST LOW 20 MIN: CPT | Performed by: ORTHOPAEDIC SURGERY

## 2024-05-06 PROCEDURE — 1125F AMNT PAIN NOTED PAIN PRSNT: CPT | Performed by: ORTHOPAEDIC SURGERY

## 2024-05-06 PROCEDURE — 20610 DRAIN/INJ JOINT/BURSA W/O US: CPT | Performed by: ORTHOPAEDIC SURGERY

## 2024-05-06 PROCEDURE — 1159F MED LIST DOCD IN RCRD: CPT | Performed by: ORTHOPAEDIC SURGERY

## 2024-05-06 PROCEDURE — 1123F ACP DISCUSS/DSCN MKR DOCD: CPT | Performed by: ORTHOPAEDIC SURGERY

## 2024-05-06 PROCEDURE — 73564 X-RAY EXAM KNEE 4 OR MORE: CPT | Mod: LT

## 2024-05-06 PROCEDURE — 1160F RVW MEDS BY RX/DR IN RCRD: CPT | Performed by: ORTHOPAEDIC SURGERY

## 2024-05-06 RX ORDER — TRIAMCINOLONE ACETONIDE 40 MG/ML
80 INJECTION, SUSPENSION INTRA-ARTICULAR; INTRAMUSCULAR
Status: COMPLETED | OUTPATIENT
Start: 2024-05-06 | End: 2024-05-06

## 2024-05-06 RX ADMIN — TRIAMCINOLONE ACETONIDE 80 MG: 40 INJECTION, SUSPENSION INTRA-ARTICULAR; INTRAMUSCULAR at 12:33

## 2024-05-06 ASSESSMENT — PAIN SCALES - GENERAL: PAINLEVEL_OUTOF10: 6

## 2024-05-06 ASSESSMENT — PAIN - FUNCTIONAL ASSESSMENT: PAIN_FUNCTIONAL_ASSESSMENT: 0-10

## 2024-05-06 NOTE — PROGRESS NOTES
ORTHOPEDIC FOLLOW UP      ============================  IMPRESSION/PLAN:  ============================  68 y.o. female with left knee osteoarthritis.     PLAN:  Discussed with the patient findings above.  Reviewed x-rays with her.  She has continued severe arthritis of left knee but she is responding well to corticosteroid injections.  She is not yet ready to discuss surgical intervention would like to try repeat injection today.  See below for injection details.  May repeat in 3 to 4 months as needed.  Postinjection instructions verbally given.    L Inj/Asp: L knee on 5/6/2024 12:33 PM  Indications: pain  Details: 25 G needle (Inferolateral) approach  Medications: 80 mg triamcinolone acetonide 40 mg/mL    Prepped with alcohol. Patient tolerated injection well. Band-aid applied to injection site.   Procedure, treatment alternatives, risks and benefits explained, specific risks discussed. Consent was given by the patient. Immediately prior to procedure a time out was called to verify the correct patient, procedure, equipment, support staff and site/side marked as required.         Caroline COY Adriano presents today for follow up of the above condition. She comes in today with increased pain in her left knee over the past few weeks.  Her pain is worse on the medial side and below the patella.  Her pain is worse in the morning and walking.  She would like to try another injection because the one in November helped for several months.     FUNCTIONAL STATUS: not limited.  AMBULATORY STATUS:  independent  PREVIOUS TREATMENTS: NSAIDS Ibuprofen as needed  with mild improvement and she did have an injection on 11/07/23 that helped.  HISTORY OF SURGERY ON AFFECTED KNEE(S): No     Review of Systems:   Constitutional: See HPI for pain assessment, No significant weight loss, recent trauma. Denies fevers/chills  Cardiovascular: No chest pain, shortness of breath  Respiratory: No difficulty breathing, cough  Gastrointestinal: No  nausea, vomiting, diarrhea, constipation  Musculoskeletal: Noted in HPI, no arthralgias   Integumentary: No rashes, easy bruising, redness   Neurological: no numbness or tingling in extremities, no gait disturbances     Patient Active Problem List   Diagnosis    Arthritis of knee    Benign essential hypertension    Hyperlipidemia    Hypertriglyceridemia    Medicare annual wellness visit, subsequent    Anxiety    Status post right knee replacement    Right knee pain    Acquired hypothyroidism       Past Medical History:   Diagnosis Date    Hypertension     Osteoarthritis of knee     Unilateral primary osteoarthritis, unspecified knee     Arthritis of knee        Allergies   Allergen Reactions    Tetracycline Rash        Past Surgical History:   Procedure Laterality Date    OTHER SURGICAL HISTORY  12/03/2020    Meniscus repair        Family History   Problem Relation Name Age of Onset    Hypertension Father          Social History     Socioeconomic History    Marital status:      Spouse name: Not on file    Number of children: Not on file    Years of education: Not on file    Highest education level: Not on file   Occupational History    Not on file   Tobacco Use    Smoking status: Never    Smokeless tobacco: Never   Substance and Sexual Activity    Alcohol use: Not Currently    Drug use: Never    Sexual activity: Not on file   Other Topics Concern    Not on file   Social History Narrative    Not on file     Social Determinants of Health     Financial Resource Strain: Not on file   Food Insecurity: Not on file   Transportation Needs: No Transportation Needs (10/12/2023)    OASIS : Transportation     Lack of Transportation (Medical): No     Lack of Transportation (Non-Medical): No     Patient Unable or Declines to Respond: No   Physical Activity: Not on file   Stress: Not on file   Social Connections: Feeling Socially Integrated (10/12/2023)    OASIS : Social Isolation     Frequency of experiencing  loneliness or isolation: Never   Intimate Partner Violence: Not on file   Housing Stability: Not on file        CURRENT MEDICATIONS:   Current Outpatient Medications   Medication Sig Dispense Refill    dicyclomine (Bentyl) 20 mg tablet Take 1 tablet (20 mg) by mouth 4 times a day as needed (abdominal pain or cramps). 360 tablet 1    Synthroid 25 mcg tablet Take 1 tablet (25 mcg) by mouth once daily in the morning. Take before meals. 90 tablet 0    valsartan-hydrochlorothiazide (Diovan-HCT) 160-12.5 mg tablet Take 1 tablet by mouth once daily. 90 tablet 1     No current facility-administered medications for this visit.        =================================  EXAM  =================================  GENERAL: A/Ox3, NAD. Appears healthy, well nourished  PSYCHIATRIC: Mood stable, appropriate memory recall  EYES: EOM intact, no scleral icterus  CARDIAC: regular rate  LUNGS: Breathing non-labored  SKIN: no erythema, rashes, or ecchymoses     MUSCULOSKELETAL:  Laterality: left Knee Exam  - Alignment: partially correctible varus deformity  - ROM: 5 to 120 degrees  - Effusion: none  - Strength: knee extension and flexion 5/5, EHL/PF/DF motor intact  - Palpation: TTP along medial joint line  - Stability: Anterior/Posterior stable, varus/valgus stable  - Gait: normal  - Hip Exam: flexion to 100+ degrees, full extension, internal/external rotation adequate, and no pain with log roll  - Special Tests: none performed    NEUROVASCULAR:  - Neurovascular Status: sensation intact to light touch distally  - Capillary refill brisk at extremities, Bilateral dorsalis pedis pulse 2+     There is no height or weight on file to calculate BMI.      IMAGING: Multi views left knee reviewed which demonstrate severe ptotic changes noted in the left knee especially medial compartment complete joint space narrowing, subchondral sclerosis, marginal Citic change and no significant advancement compared to prior films. X-rays were personally  reviewed by me.  Radiology reports were reviewed by me as well, if available at the time.        Sofia Hamilton DO  Attending Surgeon  Joint Replacement and Adult Reconstructive Surgery  [place], [place]

## 2024-05-20 ENCOUNTER — LAB (OUTPATIENT)
Dept: LAB | Facility: LAB | Age: 69
End: 2024-05-20
Payer: MEDICARE

## 2024-05-20 DIAGNOSIS — E03.9 ACQUIRED HYPOTHYROIDISM: ICD-10-CM

## 2024-05-20 LAB
T4 SERPL-MCNC: 7 UG/DL (ref 4.5–11.1)
THYROPEROXIDASE AB SERPL-ACNC: 54 IU/ML
TSH SERPL-ACNC: 2.24 MIU/L (ref 0.44–3.98)

## 2024-05-20 PROCEDURE — 84443 ASSAY THYROID STIM HORMONE: CPT

## 2024-05-20 PROCEDURE — 84436 ASSAY OF TOTAL THYROXINE: CPT

## 2024-05-20 PROCEDURE — 86376 MICROSOMAL ANTIBODY EACH: CPT

## 2024-05-20 PROCEDURE — 36415 COLL VENOUS BLD VENIPUNCTURE: CPT

## 2024-05-22 ENCOUNTER — OFFICE VISIT (OUTPATIENT)
Dept: PRIMARY CARE | Facility: CLINIC | Age: 69
End: 2024-05-22
Payer: MEDICARE

## 2024-05-22 VITALS
SYSTOLIC BLOOD PRESSURE: 134 MMHG | WEIGHT: 215 LBS | HEIGHT: 68 IN | DIASTOLIC BLOOD PRESSURE: 78 MMHG | OXYGEN SATURATION: 97 % | HEART RATE: 74 BPM | BODY MASS INDEX: 32.58 KG/M2

## 2024-05-22 DIAGNOSIS — E03.9 ACQUIRED HYPOTHYROIDISM: Primary | ICD-10-CM

## 2024-05-22 PROCEDURE — 1036F TOBACCO NON-USER: CPT | Performed by: FAMILY MEDICINE

## 2024-05-22 PROCEDURE — 99213 OFFICE O/P EST LOW 20 MIN: CPT | Performed by: FAMILY MEDICINE

## 2024-05-22 PROCEDURE — 3078F DIAST BP <80 MM HG: CPT | Performed by: FAMILY MEDICINE

## 2024-05-22 PROCEDURE — 1159F MED LIST DOCD IN RCRD: CPT | Performed by: FAMILY MEDICINE

## 2024-05-22 PROCEDURE — 3075F SYST BP GE 130 - 139MM HG: CPT | Performed by: FAMILY MEDICINE

## 2024-05-22 PROCEDURE — 1123F ACP DISCUSS/DSCN MKR DOCD: CPT | Performed by: FAMILY MEDICINE

## 2024-05-22 PROCEDURE — 1160F RVW MEDS BY RX/DR IN RCRD: CPT | Performed by: FAMILY MEDICINE

## 2024-05-22 ASSESSMENT — ENCOUNTER SYMPTOMS
DEPRESSION: 0
LOSS OF SENSATION IN FEET: 0
OCCASIONAL FEELINGS OF UNSTEADINESS: 0

## 2024-05-22 NOTE — PROGRESS NOTES
"Subjective   Patient ID: Iqra Oh is a 68 y.o. female who presents for Follow-up.    Pt is here for thyroid follow up  Her ibs is flaring a little  Pt denies cp, sob,edema, dizziness           Review of Systems   All other systems reviewed and are negative.      Objective   /78   Pulse 74   Ht 1.727 m (5' 8\")   Wt 97.5 kg (215 lb)   SpO2 97%   BMI 32.69 kg/m²     Physical Exam  Constitutional:       Appearance: Normal appearance.   HENT:      Head: Normocephalic and atraumatic.      Right Ear: Tympanic membrane, ear canal and external ear normal.      Left Ear: Tympanic membrane, ear canal and external ear normal.      Nose: Nose normal.      Mouth/Throat:      Mouth: Mucous membranes are moist.      Pharynx: Oropharynx is clear.   Eyes:      Extraocular Movements: Extraocular movements intact.      Conjunctiva/sclera: Conjunctivae normal.      Pupils: Pupils are equal, round, and reactive to light.   Cardiovascular:      Rate and Rhythm: Normal rate and regular rhythm.      Pulses: Normal pulses.      Heart sounds: Normal heart sounds.   Pulmonary:      Effort: Pulmonary effort is normal.      Breath sounds: Normal breath sounds.   Abdominal:      General: Abdomen is flat. Bowel sounds are normal.      Palpations: Abdomen is soft.   Genitourinary:     Comments: nl  Musculoskeletal:         General: Normal range of motion.      Cervical back: Normal range of motion and neck supple.   Skin:     General: Skin is warm and dry.      Capillary Refill: Capillary refill takes less than 2 seconds.   Neurological:      General: No focal deficit present.      Mental Status: She is alert and oriented to person, place, and time.   Psychiatric:         Mood and Affect: Mood normal.         Behavior: Behavior normal.         Thought Content: Thought content normal.         Assessment/Plan   Diagnoses and all orders for this visit:  Acquired hypothyroidism  -     thyroid, pork, 32.5 mg tablet; Take 1 each by " mouth once daily.  -     TSH with reflex to Free T4 if abnormal; Future

## 2024-07-29 ENCOUNTER — LAB (OUTPATIENT)
Dept: LAB | Facility: LAB | Age: 69
End: 2024-07-29
Payer: MEDICARE

## 2024-07-29 DIAGNOSIS — E03.9 ACQUIRED HYPOTHYROIDISM: ICD-10-CM

## 2024-07-29 LAB — TSH SERPL-ACNC: 1.96 MIU/L (ref 0.44–3.98)

## 2024-07-29 PROCEDURE — 84443 ASSAY THYROID STIM HORMONE: CPT

## 2024-07-29 PROCEDURE — 36415 COLL VENOUS BLD VENIPUNCTURE: CPT

## 2024-07-30 ENCOUNTER — APPOINTMENT (OUTPATIENT)
Dept: PRIMARY CARE | Facility: CLINIC | Age: 69
End: 2024-07-30
Payer: MEDICARE

## 2024-07-30 VITALS
WEIGHT: 218 LBS | BODY MASS INDEX: 33.15 KG/M2 | HEART RATE: 68 BPM | DIASTOLIC BLOOD PRESSURE: 72 MMHG | SYSTOLIC BLOOD PRESSURE: 134 MMHG | OXYGEN SATURATION: 96 %

## 2024-07-30 DIAGNOSIS — I10 BENIGN ESSENTIAL HYPERTENSION: ICD-10-CM

## 2024-07-30 DIAGNOSIS — E03.9 ACQUIRED HYPOTHYROIDISM: ICD-10-CM

## 2024-07-30 PROCEDURE — 1123F ACP DISCUSS/DSCN MKR DOCD: CPT | Performed by: FAMILY MEDICINE

## 2024-07-30 PROCEDURE — 1160F RVW MEDS BY RX/DR IN RCRD: CPT | Performed by: FAMILY MEDICINE

## 2024-07-30 PROCEDURE — 99213 OFFICE O/P EST LOW 20 MIN: CPT | Performed by: FAMILY MEDICINE

## 2024-07-30 PROCEDURE — 3078F DIAST BP <80 MM HG: CPT | Performed by: FAMILY MEDICINE

## 2024-07-30 PROCEDURE — 1159F MED LIST DOCD IN RCRD: CPT | Performed by: FAMILY MEDICINE

## 2024-07-30 PROCEDURE — 1036F TOBACCO NON-USER: CPT | Performed by: FAMILY MEDICINE

## 2024-07-30 PROCEDURE — 3075F SYST BP GE 130 - 139MM HG: CPT | Performed by: FAMILY MEDICINE

## 2024-07-30 RX ORDER — VALSARTAN AND HYDROCHLOROTHIAZIDE 160; 12.5 MG/1; MG/1
1 TABLET, FILM COATED ORAL DAILY
Qty: 90 TABLET | Refills: 1 | Status: SHIPPED | OUTPATIENT
Start: 2024-07-30

## 2024-07-30 ASSESSMENT — ENCOUNTER SYMPTOMS
OCCASIONAL FEELINGS OF UNSTEADINESS: 0
LOSS OF SENSATION IN FEET: 0
DEPRESSION: 0

## 2024-07-30 NOTE — PROGRESS NOTES
Subjective   Patient ID: Iqra Oh is a 68 y.o. female who presents for Follow-up.    Pt has improved w IBS sx  And sleeping  since she had started thyroid meds         Review of Systems   All other systems reviewed and are negative.      Objective   /72   Pulse 68   Wt 98.9 kg (218 lb)   SpO2 96%   BMI 33.15 kg/m²     Physical Exam  Constitutional:       Appearance: Normal appearance.   HENT:      Head: Normocephalic and atraumatic.      Right Ear: Tympanic membrane, ear canal and external ear normal.      Left Ear: Tympanic membrane, ear canal and external ear normal.      Nose: Nose normal.      Mouth/Throat:      Mouth: Mucous membranes are moist.      Pharynx: Oropharynx is clear.   Eyes:      Extraocular Movements: Extraocular movements intact.      Conjunctiva/sclera: Conjunctivae normal.      Pupils: Pupils are equal, round, and reactive to light.   Cardiovascular:      Rate and Rhythm: Normal rate and regular rhythm.      Pulses: Normal pulses.      Heart sounds: Normal heart sounds.   Pulmonary:      Effort: Pulmonary effort is normal.      Breath sounds: Normal breath sounds.   Abdominal:      General: Abdomen is flat. Bowel sounds are normal.      Palpations: Abdomen is soft.   Musculoskeletal:         General: Normal range of motion.      Cervical back: Normal range of motion and neck supple.   Skin:     General: Skin is warm and dry.      Capillary Refill: Capillary refill takes less than 2 seconds.   Neurological:      General: No focal deficit present.      Mental Status: She is alert and oriented to person, place, and time.   Psychiatric:         Mood and Affect: Mood normal.         Behavior: Behavior normal.         Thought Content: Thought content normal.         Assessment/Plan   Diagnoses and all orders for this visit:  Acquired hypothyroidism  -     thyroid, pork, 32.5 mg tablet; Take 1 each by mouth once daily.  -     TSH with reflex to Free T4 if abnormal; Future  -      Lipid Panel; Future  -     Comprehensive Metabolic Panel; Future  -     CBC and Auto Differential; Future  Benign essential hypertension  -     valsartan-hydrochlorothiazide (Diovan-HCT) 160-12.5 mg tablet; Take 1 tablet by mouth once daily.  -     TSH with reflex to Free T4 if abnormal; Future  -     Lipid Panel; Future  -     Comprehensive Metabolic Panel; Future  -     CBC and Auto Differential; Future

## 2024-08-12 ENCOUNTER — APPOINTMENT (OUTPATIENT)
Dept: RADIOLOGY | Facility: CLINIC | Age: 69
End: 2024-08-12
Payer: MEDICARE

## 2024-08-15 ENCOUNTER — HOSPITAL ENCOUNTER (OUTPATIENT)
Dept: RADIOLOGY | Facility: CLINIC | Age: 69
Discharge: HOME | End: 2024-08-15
Payer: MEDICARE

## 2024-08-15 VITALS — HEIGHT: 68 IN | BODY MASS INDEX: 32.58 KG/M2 | WEIGHT: 215 LBS

## 2024-08-15 DIAGNOSIS — Z12.31 SCREENING MAMMOGRAM FOR BREAST CANCER: ICD-10-CM

## 2024-08-15 PROCEDURE — 77067 SCR MAMMO BI INCL CAD: CPT

## 2024-09-12 ENCOUNTER — APPOINTMENT (OUTPATIENT)
Dept: PRIMARY CARE | Facility: CLINIC | Age: 69
End: 2024-09-12
Payer: MEDICARE

## 2024-09-24 ENCOUNTER — HOSPITAL ENCOUNTER (OUTPATIENT)
Dept: RADIOLOGY | Facility: CLINIC | Age: 69
Discharge: HOME | End: 2024-09-24
Payer: MEDICARE

## 2024-09-24 ENCOUNTER — OFFICE VISIT (OUTPATIENT)
Dept: PRIMARY CARE | Facility: CLINIC | Age: 69
End: 2024-09-24
Payer: MEDICARE

## 2024-09-24 VITALS
BODY MASS INDEX: 33.45 KG/M2 | WEIGHT: 220 LBS | HEART RATE: 107 BPM | DIASTOLIC BLOOD PRESSURE: 88 MMHG | OXYGEN SATURATION: 96 % | SYSTOLIC BLOOD PRESSURE: 138 MMHG

## 2024-09-24 DIAGNOSIS — S16.1XXA STRAIN OF NECK MUSCLE, INITIAL ENCOUNTER: Primary | ICD-10-CM

## 2024-09-24 DIAGNOSIS — S16.1XXA STRAIN OF NECK MUSCLE, INITIAL ENCOUNTER: ICD-10-CM

## 2024-09-24 PROCEDURE — 90662 IIV NO PRSV INCREASED AG IM: CPT | Performed by: FAMILY MEDICINE

## 2024-09-24 PROCEDURE — G0008 ADMIN INFLUENZA VIRUS VAC: HCPCS | Performed by: FAMILY MEDICINE

## 2024-09-24 PROCEDURE — 1036F TOBACCO NON-USER: CPT | Performed by: FAMILY MEDICINE

## 2024-09-24 PROCEDURE — 3075F SYST BP GE 130 - 139MM HG: CPT | Performed by: FAMILY MEDICINE

## 2024-09-24 PROCEDURE — 99214 OFFICE O/P EST MOD 30 MIN: CPT | Performed by: FAMILY MEDICINE

## 2024-09-24 PROCEDURE — 1160F RVW MEDS BY RX/DR IN RCRD: CPT | Performed by: FAMILY MEDICINE

## 2024-09-24 PROCEDURE — 72050 X-RAY EXAM NECK SPINE 4/5VWS: CPT

## 2024-09-24 PROCEDURE — 1159F MED LIST DOCD IN RCRD: CPT | Performed by: FAMILY MEDICINE

## 2024-09-24 PROCEDURE — 1123F ACP DISCUSS/DSCN MKR DOCD: CPT | Performed by: FAMILY MEDICINE

## 2024-09-24 PROCEDURE — 3079F DIAST BP 80-89 MM HG: CPT | Performed by: FAMILY MEDICINE

## 2024-09-24 RX ORDER — TIZANIDINE 4 MG/1
4 TABLET ORAL NIGHTLY
Qty: 30 TABLET | Refills: 2 | Status: SHIPPED | OUTPATIENT
Start: 2024-09-24 | End: 2024-12-23

## 2024-09-24 ASSESSMENT — ENCOUNTER SYMPTOMS
LOSS OF SENSATION IN FEET: 0
DEPRESSION: 0
OCCASIONAL FEELINGS OF UNSTEADINESS: 0

## 2024-09-24 ASSESSMENT — PATIENT HEALTH QUESTIONNAIRE - PHQ9
1. LITTLE INTEREST OR PLEASURE IN DOING THINGS: NOT AT ALL
SUM OF ALL RESPONSES TO PHQ9 QUESTIONS 1 AND 2: 0
2. FEELING DOWN, DEPRESSED OR HOPELESS: NOT AT ALL

## 2024-09-24 NOTE — PROGRESS NOTES
Subjective   Patient ID: Iqra Oh is a 68 y.o. female who presents for Follow-up.    HPI     Review of Systems   All other systems reviewed and are negative.    Pt is here for neck pain                                                            Pt denies any injury  Denies any new activity  Used Motrin  Onset weeks  No headache  Some ear and jaw pain  Worse in the am  No arm weakness  Does get a spasm  Objective   /88   Pulse 107   Wt 99.8 kg (220 lb)   SpO2 96%   BMI 33.45 kg/m²     Physical Exam  Constitutional:       Appearance: Normal appearance.   HENT:      Head: Normocephalic and atraumatic.      Right Ear: Tympanic membrane, ear canal and external ear normal.      Left Ear: Tympanic membrane, ear canal and external ear normal.      Nose: Nose normal.      Mouth/Throat:      Mouth: Mucous membranes are moist.      Pharynx: Oropharynx is clear.   Eyes:      Extraocular Movements: Extraocular movements intact.      Conjunctiva/sclera: Conjunctivae normal.      Pupils: Pupils are equal, round, and reactive to light.   Cardiovascular:      Rate and Rhythm: Normal rate and regular rhythm.      Pulses: Normal pulses.      Heart sounds: Normal heart sounds.   Pulmonary:      Effort: Pulmonary effort is normal.      Breath sounds: Normal breath sounds.   Abdominal:      General: Abdomen is flat. Bowel sounds are normal.      Palpations: Abdomen is soft.   Musculoskeletal:         General: Swelling and tenderness present. Normal range of motion.      Cervical back: Normal range of motion and neck supple.   Skin:     General: Skin is warm and dry.      Capillary Refill: Capillary refill takes less than 2 seconds.   Neurological:      General: No focal deficit present.      Mental Status: She is alert and oriented to person, place, and time.   Psychiatric:         Mood and Affect: Mood normal.         Behavior: Behavior normal.         Thought Content: Thought content normal.         Assessment/Plan    Diagnoses and all orders for this visit:  Strain of neck muscle, initial encounter  -     XR cervical spine 2-3 views; Future  -     tiZANidine (Zanaflex) 4 mg tablet; Take 1 tablet (4 mg) by mouth once daily at bedtime.  Other orders  -     Flu vaccine, trivalent, preservative free, HIGH-DOSE, age 65y+ (Fluzone)

## 2024-09-26 ENCOUNTER — APPOINTMENT (OUTPATIENT)
Dept: ORTHOPEDIC SURGERY | Facility: CLINIC | Age: 69
End: 2024-09-26
Payer: MEDICARE

## 2024-09-30 ENCOUNTER — HOSPITAL ENCOUNTER (OUTPATIENT)
Dept: RADIOLOGY | Facility: CLINIC | Age: 69
Discharge: HOME | End: 2024-09-30
Payer: MEDICARE

## 2024-09-30 ENCOUNTER — APPOINTMENT (OUTPATIENT)
Dept: ORTHOPEDIC SURGERY | Facility: CLINIC | Age: 69
End: 2024-09-30
Payer: MEDICARE

## 2024-09-30 VITALS — BODY MASS INDEX: 33.34 KG/M2 | HEIGHT: 68 IN | WEIGHT: 220 LBS

## 2024-09-30 DIAGNOSIS — M17.11 PRIMARY OSTEOARTHRITIS OF RIGHT KNEE: ICD-10-CM

## 2024-09-30 PROCEDURE — 1036F TOBACCO NON-USER: CPT | Performed by: ORTHOPAEDIC SURGERY

## 2024-09-30 PROCEDURE — 3008F BODY MASS INDEX DOCD: CPT | Performed by: ORTHOPAEDIC SURGERY

## 2024-09-30 PROCEDURE — 73560 X-RAY EXAM OF KNEE 1 OR 2: CPT | Mod: RT

## 2024-09-30 PROCEDURE — 99213 OFFICE O/P EST LOW 20 MIN: CPT | Performed by: ORTHOPAEDIC SURGERY

## 2024-09-30 PROCEDURE — 1123F ACP DISCUSS/DSCN MKR DOCD: CPT | Performed by: ORTHOPAEDIC SURGERY

## 2024-09-30 PROCEDURE — 1159F MED LIST DOCD IN RCRD: CPT | Performed by: ORTHOPAEDIC SURGERY

## 2024-09-30 PROCEDURE — 73560 X-RAY EXAM OF KNEE 1 OR 2: CPT | Mod: RIGHT SIDE | Performed by: RADIOLOGY

## 2024-09-30 NOTE — PROGRESS NOTES
ORTHOPEDIC TOTAL JOINT  POST-OPERATIVE FOLLOW UP      ============================  IMPRESSION/PLAN:  ============================  68 y.o. female s/p Right Total Knee Replacement completed on 09/29/2023    PLAN:  Patient is 1 year out from surgery and doing very well at this time.  Early issues at this time is left knee and she does have a severe history of arthritis of the left knee and would like to discuss surgical intervention.  She like to proceed in January.  Will schedule for preoperative appointment for Scheduling purposes.  Is no issues with the right knee noted at this time.  Current x-rays reviewed with her demonstrate stable implants.  Follow-up as needed regarding her right knee.      Caroline COY Adriano presents today for follow up of joint replacement above.  Patient is 3 months out from a right knee replacement overall is doing well.  Continues to notice improvement.  Ambulates no assistive device.  Done with physical therapy and working out on her own.  Left knee is continually bothersome and has no history of osteoarthritis and was to discuss surgical intervention in the future.    Review of Systems:   Constitutional: See HPI for pain assessment, No significant weight loss, recent trauma. Denies fevers/chills  Cardiovascular: No chest pain, shortness of breath  Respiratory: No difficulty breathing, cough  Gastrointestinal: No nausea, vomiting, diarrhea, constipation  Musculoskeletal: Noted in HPI, no arthralgias   Integumentary: No rashes, easy bruising, redness   Neurological: no numbness or tingling in extremities, no gait disturbances     Patient Active Problem List   Diagnosis    Arthritis of knee    Benign essential hypertension    Hyperlipidemia    Hypertriglyceridemia    Medicare annual wellness visit, subsequent    Anxiety    Status post right knee replacement    Right knee pain    Acquired hypothyroidism        =================================  EXAM  =================================  GENERAL: A/Ox3, NAD. Appears healthy, well nourished  CARDIAC: regular rate  LUNGS: Breathing non-labored    MUSCULOSKELETAL:  Laterality: right knee  - Incision: No overlying, erythema, induration, or drainage. Incision healing well with no wound dehiscence  - ROM: 0 -120 degrees  - Palpation: appropriate post operative TTP along surgical incision  - compartments soft, negative homans  - can active straight leg raise with no extensor lag    NEUROVASCULAR:  - Neurovascular Status: sensation intact to light touch distally  - Capillary refill brisk at extremities, Bilateral dorsalis pedis pulse 2+     Imagin views right knee demonstrate no signs of loosening failure of right total knee arthroplasty. X-rays were personally reviewed by me.  Radiology reports were reviewed by me as well, if available at the time.        Sofia Hamilton DO  Attending Surgeon  Joint Replacement and Adult Reconstructive Surgery  [place], [place]

## 2024-11-06 ENCOUNTER — APPOINTMENT (OUTPATIENT)
Dept: PRIMARY CARE | Facility: CLINIC | Age: 69
End: 2024-11-06
Payer: MEDICARE

## 2024-12-02 ENCOUNTER — APPOINTMENT (OUTPATIENT)
Dept: ORTHOPEDIC SURGERY | Facility: CLINIC | Age: 69
End: 2024-12-02
Payer: MEDICARE

## 2024-12-03 ENCOUNTER — APPOINTMENT (OUTPATIENT)
Dept: ORTHOPEDIC SURGERY | Facility: CLINIC | Age: 69
End: 2024-12-03
Payer: MEDICARE

## 2024-12-03 ENCOUNTER — PREP FOR PROCEDURE (OUTPATIENT)
Dept: ORTHOPEDIC SURGERY | Facility: CLINIC | Age: 69
End: 2024-12-03

## 2024-12-03 ENCOUNTER — HOSPITAL ENCOUNTER (OUTPATIENT)
Dept: RADIOLOGY | Facility: CLINIC | Age: 69
Discharge: HOME | End: 2024-12-03
Payer: MEDICARE

## 2024-12-03 VITALS — WEIGHT: 226.4 LBS | HEIGHT: 68 IN | BODY MASS INDEX: 34.31 KG/M2

## 2024-12-03 DIAGNOSIS — M17.12 PRIMARY OSTEOARTHRITIS OF LEFT KNEE: ICD-10-CM

## 2024-12-03 DIAGNOSIS — M25.362 INSTABILITY OF LEFT KNEE JOINT: ICD-10-CM

## 2024-12-03 PROCEDURE — G2211 COMPLEX E/M VISIT ADD ON: HCPCS | Performed by: ORTHOPAEDIC SURGERY

## 2024-12-03 PROCEDURE — 73564 X-RAY EXAM KNEE 4 OR MORE: CPT | Mod: LEFT SIDE | Performed by: RADIOLOGY

## 2024-12-03 PROCEDURE — 3008F BODY MASS INDEX DOCD: CPT | Performed by: ORTHOPAEDIC SURGERY

## 2024-12-03 PROCEDURE — 1123F ACP DISCUSS/DSCN MKR DOCD: CPT | Performed by: ORTHOPAEDIC SURGERY

## 2024-12-03 PROCEDURE — 99214 OFFICE O/P EST MOD 30 MIN: CPT | Performed by: ORTHOPAEDIC SURGERY

## 2024-12-03 PROCEDURE — 1036F TOBACCO NON-USER: CPT | Performed by: ORTHOPAEDIC SURGERY

## 2024-12-03 PROCEDURE — 73564 X-RAY EXAM KNEE 4 OR MORE: CPT | Mod: LT

## 2024-12-03 PROCEDURE — 1159F MED LIST DOCD IN RCRD: CPT | Performed by: ORTHOPAEDIC SURGERY

## 2024-12-03 ASSESSMENT — PAIN DESCRIPTION - DESCRIPTORS: DESCRIPTORS: TIGHTNESS

## 2024-12-03 ASSESSMENT — PAIN - FUNCTIONAL ASSESSMENT: PAIN_FUNCTIONAL_ASSESSMENT: 0-10

## 2024-12-03 ASSESSMENT — PAIN SCALES - GENERAL: PAINLEVEL_OUTOF10: 5 - MODERATE PAIN

## 2024-12-03 NOTE — PROGRESS NOTES
ORTHOPEDIC FOLLOW UP      ============================  IMPRESSION/PLAN:  ============================  69 y.o. female with left knee osteoarthritis.     PLAN:  Caroline COY Scurfield has radiographic and physical exam evidence of degenerative joint disease and wishes to pursue surgery. The patient appears to have sufficient symptoms to warrant surgical intervention and is an appropriate candidate for  left Total Knee Arthroplasty as evidenced by six months of unsuccessful non-operative treatment as outlined in the HPI, progressive symptoms including pain impacting sleep or causing fatigue, pain impacting work, pain worsened with weight bearing, and pain limiting ability to stay fit and healthy.     We had a lengthy discussion regarding the risk and benefit of surgery, the alternatives, limitations, and personnel involved. The include but are not limited to infection, persistent pain, instability, nerve injury, blood clots, and medical complications. We also discussed the pre-operative course, surgery itself, and rehabilitation. Perioperative blood management and transfusion issues were discussed, and options clearly outlined. The patient consented to the use of allogenic blood if medically necessary.     The patient has elected to schedule surgery at this time or intents to call the office with a surgical date. Shared decision making occurred while obtaining informed consent. The patient with be scheduled for a pre-operative education class. The patient will be ordered appropriate preoperative labs to be completed for preadmission testing.     Robotic Assisted Surgery: Yes. The use of robotic assisted surgery was discussed with the patient.  The risk, benefits were discussed regarding this.  Patient consented for this procedure.  We discussed specifically placement of pins and arrays for navigation during surgery and to help with the robotic assistance.  We discussed the use of an additional bandage to cover the pin  sites.  We also reviewed that they may have some slight pain at the placement of pin sites that should improve in the same fashion as their general recovery of the joint replacement.    We discussed the term robot, when used to describe the JONATHAN system, refers to the [id number] robotic arm. The JONATHAN System is not a robot, but a surgeon-controlled robotic-arm assisted device.      - Preoperative Consults: PAT Clearance, does not need to repeat joint replacement class  - Disposition: Rapid recovery  - Anesthesia Plan: Preoperative block, spinal, local  - Implants: Walker CS, Jonathan  - Physical Therapy Plan: Home  - [id number]: Yes  - Surgical Approach: Subvastus   - DVT PPx: ASA    Risk Assessment for Post-Op Antibiotics   - none present    I hereby indicate that these comorbidities may have a detrimental effect on this arthroplasty.   - none present    Caroline COY Scurfyuriy presents today for follow up of the above condition. She is here for a pre-op visit for her left knee. XR done today.      FUNCTIONAL STATUS: not limited.  AMBULATORY STATUS:  independent  PREVIOUS TREATMENTS: NSAIDS Ibuprofen as needed  with mild improvement  Cortisone injection 5/6/2024 performed last visit, with mild improvement   HISTORY OF SURGERY ON AFFECTED KNEE(S): No     Review of Systems:   Constitutional: See HPI for pain assessment, No significant weight loss, recent trauma. Denies fevers/chills  Cardiovascular: No chest pain, shortness of breath  Respiratory: No difficulty breathing, cough  Gastrointestinal: No nausea, vomiting, diarrhea, constipation  Musculoskeletal: Noted in HPI, no arthralgias   Integumentary: No rashes, easy bruising, redness   Neurological: no numbness or tingling in extremities, no gait disturbances     Patient Active Problem List   Diagnosis    Arthritis of knee    Benign essential hypertension    Hyperlipidemia    Hypertriglyceridemia    Medicare annual wellness visit, subsequent    Anxiety    Status post right knee  replacement    Right knee pain    Acquired hypothyroidism       Past Medical History:   Diagnosis Date    Hypertension     Osteoarthritis of knee     Unilateral primary osteoarthritis, unspecified knee     Arthritis of knee        Allergies   Allergen Reactions    Tetracycline Rash        Past Surgical History:   Procedure Laterality Date    BREAST CYST EXCISION Right     RT excisional biopsy, benign    OTHER SURGICAL HISTORY  12/03/2020    Meniscus repair        Family History   Problem Relation Name Age of Onset    Hypertension Father      Breast cancer Mother's Sister      Ovarian cancer Cousin          Social History     Socioeconomic History    Marital status:      Spouse name: Not on file    Number of children: Not on file    Years of education: Not on file    Highest education level: Not on file   Occupational History    Not on file   Tobacco Use    Smoking status: Never    Smokeless tobacco: Never   Substance and Sexual Activity    Alcohol use: Not Currently    Drug use: Never    Sexual activity: Not on file   Other Topics Concern    Not on file   Social History Narrative    Not on file     Social Drivers of Health     Financial Resource Strain: Not on file   Food Insecurity: Not on file   Transportation Needs: No Transportation Needs (10/12/2023)    OASIS : Transportation     Lack of Transportation (Medical): No     Lack of Transportation (Non-Medical): No     Patient Unable or Declines to Respond: No   Physical Activity: Not on file   Stress: Not on file   Social Connections: Feeling Socially Integrated (10/12/2023)    OASIS : Social Isolation     Frequency of experiencing loneliness or isolation: Never   Intimate Partner Violence: Not on file   Housing Stability: Not on file        CURRENT MEDICATIONS:   Current Outpatient Medications   Medication Sig Dispense Refill    dicyclomine (Bentyl) 20 mg tablet Take 1 tablet (20 mg) by mouth 4 times a day as needed (abdominal pain or cramps). 360  tablet 1    thyroid, pork, 32.5 mg tablet Take 1 each by mouth once daily. 90 tablet 1    tiZANidine (Zanaflex) 4 mg tablet Take 1 tablet (4 mg) by mouth once daily at bedtime. 30 tablet 0    valsartan-hydrochlorothiazide (Diovan-HCT) 160-12.5 mg tablet Take 1 tablet by mouth once daily. 90 tablet 1     No current facility-administered medications for this visit.        =================================  EXAM  =================================  GENERAL: A/Ox3, NAD. Appears healthy, well nourished  PSYCHIATRIC: Mood stable, appropriate memory recall  EYES: EOM intact, no scleral icterus  CARDIAC: regular rate  LUNGS: Breathing non-labored  SKIN: no erythema, rashes, or ecchymoses     MUSCULOSKELETAL:  Laterality: left Knee Exam  - Alignment: partially correctible varus deformity  - ROM: 5 to 120 degrees  - Effusion: none  - Strength: knee extension and flexion 5/5, EHL/PF/DF motor intact  - Palpation: TTP along medial joint line  - Stability: Anterior/Posterior stable, varus/valgus stable  - Gait: normal  - Hip Exam: flexion to 100+ degrees, full extension, internal/external rotation adequate, and no pain with log roll  - Special Tests: none performed    NEUROVASCULAR:  - Neurovascular Status: sensation intact to light touch distally  - Capillary refill brisk at extremities, Bilateral dorsalis pedis pulse 2+     There is no height or weight on file to calculate BMI.      IMAGING: Multi views left knee reviewed which demonstrate severe ptotic changes noted in the left knee especially medial compartment complete joint space narrowing, subchondral sclerosis, marginal Costephytic change and no significant advancement compared to prior films. X-rays were personally reviewed by me.  Radiology reports were reviewed by me as well, if available at the time.        Sofia Hamilton DO  Attending Surgeon  Joint Replacement and Adult Reconstructive Surgery  [place], [place]

## 2024-12-19 ENCOUNTER — HOSPITAL ENCOUNTER (OUTPATIENT)
Dept: RADIOLOGY | Facility: HOSPITAL | Age: 69
Discharge: HOME | End: 2024-12-19
Payer: MEDICARE

## 2024-12-19 ENCOUNTER — PRE-ADMISSION TESTING (OUTPATIENT)
Dept: PREADMISSION TESTING | Facility: HOSPITAL | Age: 69
End: 2024-12-19
Payer: MEDICARE

## 2024-12-19 VITALS
HEART RATE: 89 BPM | OXYGEN SATURATION: 96 % | SYSTOLIC BLOOD PRESSURE: 152 MMHG | WEIGHT: 224.43 LBS | RESPIRATION RATE: 14 BRPM | TEMPERATURE: 97.9 F | BODY MASS INDEX: 34.01 KG/M2 | HEIGHT: 68 IN | DIASTOLIC BLOOD PRESSURE: 88 MMHG

## 2024-12-19 DIAGNOSIS — Z01.818 PREOP TESTING: Primary | ICD-10-CM

## 2024-12-19 DIAGNOSIS — M17.12 PRIMARY OSTEOARTHRITIS OF LEFT KNEE: ICD-10-CM

## 2024-12-19 DIAGNOSIS — R73.09 ELEVATED GLUCOSE: ICD-10-CM

## 2024-12-19 DIAGNOSIS — M25.362 INSTABILITY OF LEFT KNEE JOINT: ICD-10-CM

## 2024-12-19 LAB
ALBUMIN SERPL BCP-MCNC: 4.7 G/DL (ref 3.4–5)
ALP SERPL-CCNC: 64 U/L (ref 33–136)
ALT SERPL W P-5'-P-CCNC: 20 U/L (ref 7–45)
ANION GAP SERPL CALC-SCNC: 13 MMOL/L (ref 10–20)
AST SERPL W P-5'-P-CCNC: 19 U/L (ref 9–39)
ATRIAL RATE: 85 BPM
BILIRUB SERPL-MCNC: 0.6 MG/DL (ref 0–1.2)
BUN SERPL-MCNC: 17 MG/DL (ref 6–23)
CALCIUM SERPL-MCNC: 10.3 MG/DL (ref 8.6–10.3)
CHLORIDE SERPL-SCNC: 102 MMOL/L (ref 98–107)
CO2 SERPL-SCNC: 29 MMOL/L (ref 21–32)
CREAT SERPL-MCNC: 0.96 MG/DL (ref 0.5–1.05)
EGFRCR SERPLBLD CKD-EPI 2021: 64 ML/MIN/1.73M*2
ERYTHROCYTE [DISTWIDTH] IN BLOOD BY AUTOMATED COUNT: 12.2 % (ref 11.5–14.5)
GLUCOSE SERPL-MCNC: 108 MG/DL (ref 74–99)
HCT VFR BLD AUTO: 42.9 % (ref 36–46)
HGB BLD-MCNC: 14.3 G/DL (ref 12–16)
MCH RBC QN AUTO: 29.3 PG (ref 26–34)
MCHC RBC AUTO-ENTMCNC: 33.3 G/DL (ref 32–36)
MCV RBC AUTO: 88 FL (ref 80–100)
NRBC BLD-RTO: NORMAL /100{WBCS}
P AXIS: 71 DEGREES
P OFFSET: 209 MS
P ONSET: 156 MS
PLATELET # BLD AUTO: 308 X10*3/UL (ref 150–450)
POTASSIUM SERPL-SCNC: 4.1 MMOL/L (ref 3.5–5.3)
PR INTERVAL: 130 MS
PROT SERPL-MCNC: 7.5 G/DL (ref 6.4–8.2)
Q ONSET: 221 MS
QRS COUNT: 14 BEATS
QRS DURATION: 98 MS
QT INTERVAL: 396 MS
QTC CALCULATION(BAZETT): 471 MS
QTC FREDERICIA: 444 MS
R AXIS: 60 DEGREES
RBC # BLD AUTO: 4.88 X10*6/UL (ref 4–5.2)
SODIUM SERPL-SCNC: 140 MMOL/L (ref 136–145)
T AXIS: 56 DEGREES
T OFFSET: 419 MS
VENTRICULAR RATE: 85 BPM
WBC # BLD AUTO: 5.7 X10*3/UL (ref 4.4–11.3)

## 2024-12-19 PROCEDURE — 36415 COLL VENOUS BLD VENIPUNCTURE: CPT

## 2024-12-19 PROCEDURE — 93010 ELECTROCARDIOGRAM REPORT: CPT | Performed by: INTERNAL MEDICINE

## 2024-12-19 PROCEDURE — 84075 ASSAY ALKALINE PHOSPHATASE: CPT

## 2024-12-19 PROCEDURE — 85027 COMPLETE CBC AUTOMATED: CPT

## 2024-12-19 PROCEDURE — 83036 HEMOGLOBIN GLYCOSYLATED A1C: CPT | Mod: BEALAB

## 2024-12-19 PROCEDURE — 93005 ELECTROCARDIOGRAM TRACING: CPT

## 2024-12-19 PROCEDURE — 87081 CULTURE SCREEN ONLY: CPT | Mod: BEALAB

## 2024-12-19 PROCEDURE — 73700 CT LOWER EXTREMITY W/O DYE: CPT | Mod: LT

## 2024-12-19 RX ORDER — IBUPROFEN 200 MG
TABLET ORAL EVERY 6 HOURS PRN
COMMUNITY

## 2024-12-19 RX ORDER — CHLORHEXIDINE GLUCONATE 40 MG/ML
SOLUTION TOPICAL DAILY
Qty: 470 ML | Refills: 0 | Status: SHIPPED | OUTPATIENT
Start: 2024-12-19 | End: 2024-12-24

## 2024-12-19 RX ORDER — CHLORHEXIDINE GLUCONATE ORAL RINSE 1.2 MG/ML
SOLUTION DENTAL
Qty: 473 ML | Refills: 0 | Status: SHIPPED | OUTPATIENT
Start: 2024-12-19

## 2024-12-19 ASSESSMENT — ENCOUNTER SYMPTOMS
MUSCULOSKELETAL NEGATIVE: 1
ENDOCRINE NEGATIVE: 1
CARDIOVASCULAR NEGATIVE: 1
RHINORRHEA: 1
NEUROLOGICAL NEGATIVE: 1
EYES NEGATIVE: 1
CONSTITUTIONAL NEGATIVE: 1
GASTROINTESTINAL NEGATIVE: 1
RESPIRATORY NEGATIVE: 1
NECK NEGATIVE: 1

## 2024-12-19 ASSESSMENT — PAIN DESCRIPTION - DESCRIPTORS: DESCRIPTORS: OTHER (COMMENT)

## 2024-12-19 ASSESSMENT — PAIN - FUNCTIONAL ASSESSMENT: PAIN_FUNCTIONAL_ASSESSMENT: 0-10

## 2024-12-19 ASSESSMENT — PAIN SCALES - GENERAL: PAINLEVEL_OUTOF10: 4

## 2024-12-19 ASSESSMENT — LIFESTYLE VARIABLES: SMOKING_STATUS: NONSMOKER

## 2024-12-19 NOTE — H&P (VIEW-ONLY)
"CPM/PAT Evaluation       Name: Caroline COY Scurfield (Caroline COY Scurfield \"Iqra\")  /Age: 1955/69 y.o.     Visit Type:   In-Person       Chief Complaint: left knee pain    HPI: Patient is a 70 yo F scheduled for left total knee arthroplasty on 01/10/2025 with Dr. Hamilton secondary to left knee osteoarthritis. Patient was referred by Dr. Hamilton to CPM today for perioperative risk stratification and optimization. Patient's PMHx is notable for anxiety, HTN, IBS, thyroid disease, osteoarthritis      Past Medical History:   Diagnosis Date    Anxiety     Arthritis May 2019    Hypertension     Hyperthyroidism     Irritable bowel syndrome 10 years    Obesity ?    Osteoarthritis of knee     Tear of meniscus of knee May 2019    Unilateral primary osteoarthritis, unspecified knee     Arthritis of knee       Past Surgical History:   Procedure Laterality Date    BREAST CYST EXCISION Right     RT excisional biopsy, benign    HYSTERECTOMY      KNEE ARTHROPLASTY Right     MYOMECTOMY      OTHER SURGICAL HISTORY Right 2020    Meniscus repair       Patient  reports that she is not currently sexually active and has had partner(s) who are male. She reports using the following method of birth control/protection: Post-menopausal.    Family History   Problem Relation Name Age of Onset    Hypertension Father Saul     Hearing loss Father Saul     Heart disease Father Saul     Breast cancer Mother's Sister      Ovarian cancer Cousin      Depression Mother [place]     Miscarriages / Stillbirths Mother [place]     Stroke Mother [place]     Diabetes Brother Philipp     Kidney disease Brother Philipp     Stroke Brother Philipp     Cancer Brother Fredo        Allergies   Allergen Reactions    Tetracycline Rash       Prior to Admission medications    Medication Sig Start Date End Date Taking? Authorizing Provider   dicyclomine (Bentyl) 20 mg tablet Take 1 tablet (20 mg) by mouth 4 times a day as needed (abdominal pain or cramps). 24 " Yes Kajal Chery DO   ibuprofen 200 mg tablet Take by mouth every 6 hours if needed for mild pain (1 - 3).   Yes Historical Provider, MD   thyroid, pork, 32.5 mg tablet Take 1 each by mouth once daily. 11/6/24  Yes Kajal Chery DO   valsartan-hydrochlorothiazide (Diovan-HCT) 160-12.5 mg tablet Take 1 tablet by mouth once daily. 11/6/24  Yes Kajal Chery DO   tiZANidine (Zanaflex) 4 mg tablet Take 1 tablet (4 mg) by mouth once daily at bedtime. 11/18/24 12/18/24  Kajal Chery DO        PAT ROS:   Constitutional:   neg    Neuro/Psych:   neg    Eyes:   neg    Ears:   Nose:   neg     nasal discharge  Mouth:   neg    Throat:   neg    Neck:   neg    Cardio:   neg    Respiratory:   neg    Endocrine:   neg    GI:   neg    :   neg    Musculoskeletal:   neg    Hematologic:   neg    Skin:  neg        Physical Exam  Vitals and nursing note reviewed.   Constitutional:       General: She is not in acute distress.     Appearance: She is not ill-appearing or toxic-appearing.   HENT:      Head: Normocephalic and atraumatic.      Nose: Nose normal.      Mouth/Throat:      Mouth: Mucous membranes are moist.   Eyes:      Extraocular Movements: Extraocular movements intact.      Conjunctiva/sclera: Conjunctivae normal.   Neck:      Vascular: No carotid bruit.   Cardiovascular:      Rate and Rhythm: Normal rate and regular rhythm.      Pulses: Normal pulses.      Heart sounds: Normal heart sounds. No murmur heard.  Pulmonary:      Effort: Pulmonary effort is normal.      Breath sounds: Normal breath sounds.   Abdominal:      General: There is no distension.      Palpations: Abdomen is soft.      Tenderness: There is no abdominal tenderness.   Musculoskeletal:         General: Normal range of motion.      Cervical back: Normal range of motion.   Skin:     General: Skin is warm and dry.      Capillary Refill: Capillary refill takes less than 2 seconds.   Neurological:      General: No focal deficit present.      Mental  "Status: She is alert.   Psychiatric:         Mood and Affect: Mood normal.         Behavior: Behavior normal.          PAT AIRWAY:   Airway:     Mallampati::  II    TM distance::  >3 FB    Neck ROM::  Full          Visit Vitals  /88   Pulse 89   Temp 36.6 °C (97.9 °F) (Temporal)   Resp 14   Ht 1.727 m (5' 8\")   Wt 102 kg (224 lb 6.9 oz)   SpO2 96%   BMI 34.12 kg/m²   OB Status Hysterectomy   Smoking Status Never   BSA 2.21 m²       DASI Risk Score      Flowsheet Row Questionnaire Series Submission from 12/17/2024 in [facility] with Generic Provider Joie   Can you take care of yourself (eat, dress, bathe, or use toilet)?  2.75  filed at 12/17/2024 0825   Can you walk indoors, such as around your house? 1.75  filed at 12/17/2024 0825   Can you walk a block or two on level ground?  2.75  filed at 12/17/2024 0825   Can you climb a flight of stairs or walk up a hill? 5.5  filed at 12/17/2024 0825   Can you run a short distance? 0  filed at 12/17/2024 0825   Can you do light work around the house like dusting or washing dishes? 2.7  filed at 12/17/2024 0825   Can you do moderate work around the house like vacuuming, sweeping floors or carrying groceries? 3.5  filed at 12/17/2024 0825   Can you do heavy work around the house like scrubbing floors or lifting and moving heavy furniture?  0  filed at 12/17/2024 0825   Can you do yard work like raking leaves, weeding or pushing a mower? 0  filed at 12/17/2024 0825   Can you have sexual relations? 5.25  filed at 12/17/2024 0825   Can you participate in moderate recreational activities like golf, bowling, dancing, doubles tennis or throwing a baseball or football? 6  filed at 12/17/2024 0825   Can you participate in strenous sports like swimming, singles tennis, football, basketball, or skiing? 0  filed at 12/17/2024 0825   DASI SCORE 30.2  filed at 12/17/2024 0825   METS Score (Will be calculated only when all the questions are answered) 6.5  filed at 12/17/2024 0825 "          Caprini DVT Assessment      Flowsheet Row Pre-Admission Testing from 12/19/2024 in [facility]   DVT Score 10 filed at 12/19/2024 1103   Surgical Factors Elective major lower extremity arthroplasty filed at 12/19/2024 1103   BMI 31-40 (Obesity) filed at 12/19/2024 1103          Modified Frailty Index      Flowsheet Row Pre-Admission Testing from 12/19/2024 in [facility]   Non-independent functional status (problems with dressing, bathing, personal grooming, or cooking) 0 filed at 12/19/2024 1104   History of diabetes mellitus  0 filed at 12/19/2024 1104   History of COPD 0 filed at 12/19/2024 1104   History of CHF No filed at 12/19/2024 1104   History of MI 0 filed at 12/19/2024 1104   History of Percutaneous Coronary Intervention, Cardiac Surgery, or Angina No filed at 12/19/2024 1104   Hypertension requiring the use of medication  0.0909 filed at 12/19/2024 1104   Peripheral vascular disease 0 filed at 12/19/2024 1104   Impaired sensorium (cognitive impairement or loss, clouding, or delirium) 0 filed at 12/19/2024 1104   TIA or CVA withouy residual deficit 0 filed at 12/19/2024 1104   Cerebrovascular accident with deficit 0 filed at 12/19/2024 1104   Modified Frailty Index Calculator .0909 filed at 12/19/2024 1104          CHADS2 Stroke Risk  Current as of yesterday        N/A 3 to 100%: High Risk   2 to < 3%: Medium Risk   0 to < 2%: Low Risk     Last Change: N/A          This score determines the patient's risk of having a stroke if the patient has atrial fibrillation.        This score is not applicable to this patient. Components are not calculated.          Revised Cardiac Risk Index      Flowsheet Row Pre-Admission Testing from 12/19/2024 in [facility]   High-Risk Surgery (Intraperitoneal, Intrathoracic,Suprainguinal vascular) 0 filed at 12/19/2024 1103   History of ischemic heart disease (History of MI, History of positive exercuse test, Current  chest paint considered due to myocardial ischemia, Use of nitrate therapy, ECG with pathological Q Waves) 0 filed at 12/19/2024 1103   History of congestive heart failure (pulmonary edemia, bilateral rales or S3 gallop, Paroxysmal nocturnal dyspnea, CXR showing pulmonary vascular redistribution) 0 filed at 12/19/2024 1103   History of cerebrovascular disease (Prior TIA or stroke) 0 filed at 12/19/2024 1103   Pre-operative insulin treatment 0 filed at 12/19/2024 1103   Pre-operative creatinine>2 mg/dl 0 filed at 12/19/2024 1103   Revised Cardiac Risk Calculator 0 filed at 12/19/2024 1103          Apfel Simplified Score      Flowsheet Row Pre-Admission Testing from 12/19/2024 in [facility]   Smoking status 1 filed at 12/19/2024 1103   History of motion sickness or PONV  0 filed at 12/19/2024 1103   Use of postoperative opioids 0 filed at 12/19/2024 1103   Gender - Female 1=Yes filed at 12/19/2024 1103   Apfel Simplified Score Calculator 2 filed at 12/19/2024 1103          Risk Analysis Index Results This Encounter    No data found in the last 10 encounters.       Stop Bang Score      Flowsheet Row Pre-Admission Testing from 12/19/2024 in [facility] Questionnaire Series Submission from 12/17/2024 in [facility] with Generic Provider Joie   Do you snore loudly? 0 filed at 12/19/2024 1033 0  filed at 12/17/2024 0825   Do you often feel tired or fatigued after your sleep? 0 filed at 12/19/2024 1033 0  filed at 12/17/2024 0825   Has anyone ever observed you stop breathing in your sleep? 0 filed at 12/19/2024 1033 0  filed at 12/17/2024 0825   Do you have or are you being treated for high blood pressure? 1 filed at 12/19/2024 1033 1  filed at 12/17/2024 0825   Recent BMI (Calculated) 34.1 filed at 12/19/2024 1033 34.4  filed at 12/17/2024 0825   Is BMI greater than 35 kg/m2? 0=No filed at 12/19/2024 1033 0=No  filed at 12/17/2024 0825   Age older than 50 years old? 1=Yes filed at  12/19/2024 1033 1=Yes  filed at 12/17/2024 0825   Is your neck circumference greater than 17 inches (Male) or 16 inches (Female)? 1 filed at 12/19/2024 1033 --   Gender - Male 0=No filed at 12/19/2024 1033 0=No  filed at 12/17/2024 0825   STOP-BANG Total Score 3 filed at 12/19/2024 1033 --          Prodigy: High Risk  Total Score: 8              Prodigy Age Score           ARISCAT Score for Postoperative Pulmonary Complications      Flowsheet Row Pre-Admission Testing from 12/19/2024 in [facility]   Age, years  3 filed at 12/19/2024 1044   Preoperative SpO2 0 filed at 12/19/2024 1044   Respiratory infection in the last month Either upper or lower (i.e., URI, bronchitis, pneumonia), with fever and antibiotic treatment 0 filed at 12/19/2024 1044   Preoperative anemoa (Hgb less than 10 g/dl) 0 filed at 12/19/2024 1044   Surgical incision  0 filed at 12/19/2024 1044   Duration of surgery  16 filed at 12/19/2024 1044   Emergency Procedure  0 filed at 12/19/2024 1044   ARISCAT Total Score  19 filed at 12/19/2024 1044          Zari Perioperative Risk for Myocardial Infarction or Cardiac Arrest (DECLAN)      Flowsheet Row Pre-Admission Testing from 12/19/2024 in [facility]   Age 1.38 filed at 12/19/2024 1104   Functional Status  0 filed at 12/19/2024 1104   ASA Class  -3.29 filed at 12/19/2024 1104   Creatinine 0 filed at 12/19/2024 1104   Type of Procedure  0.80 filed at 12/19/2024 1104   DECLAN Total Score  -6.36 filed at 12/19/2024 1104   DECLAN % 0.17 filed at 12/19/2024 1104            Assessment & Plan    Neuro:    -Anxiety- not currently on meds, denies any symptoms at this time  No diagnosis or significant findings on chart review or clinical presentation and evaluation.  Preoperative brain exercise educational handout provided to patient.    The patient is at an increased risk for perioperative stroke secondary to increased age, HTN, and female sex .    HEENT/Airway:   No diagnosis or  significant findings on chart review or clinical presentation and evaluation.   STOP-BANG Score- 3 points moderaterisk for SIMA    Mallampati::  II    TM distance::  >3 FB    Neck ROM::  Full  Dentures-denies  Crowns-reports multiple  Implants-denies    Cardiovascular:    -HTN - on valsartan-hydrochlorothiazide (24 hour hold)  No additional preoperative testing is currently indicated.  METS: 6.5  RCRI: 0 points, 3.9%    30 day risk of MACE (risk for cardiac death, nonfatal myocardial infarction, and nonfactal cardiac arrest  DECLAN:   0.17 % risk of intraoperative or 30-day postoperative MACE  EKG -normal sinus rhythm, incomplete RBBB  Rate- 85; No acute changes when compared to EKG from 12/03/2020    Pulmonary:     No diagnosis or significant findings on chart review or clinical presentation and evaluation.   ARISCAT: <26 points, 1.6% risk of in-hospital postoperative pulmonary complication  PRODIGY: Moderate risk for opioid induced respiratory depression  Smoking History-She has never smoked.  Preoperative deep breathing educational handout provided to patient.    Renal:  No diagnosis or significant findings on chart review or clinical presentation and evaluation, however, the patient is at increased risk of perioperative renal complications secondary to age>/= 56 and HTN. Preventative measures include BP monitoring, medication compliance, and hydration management.   CMP-Pending    Endocrine:    -Thyroid disease- takes thyroid , last TSH 7/29/2024 was WNL    Hematologic:    No diagnosis or significant findings on chart review or clinical presentation and evaluation.  The patient is not a Jehovah’s witness and will accept blood and blood products if medically indicated.   History of previous blood transfusions No  CBC-Pending    Caprini Score 10, patient at High for postoperative DVT. Pt supplied education/VTE handout  Anticoagulation use: No   Preoperative DVT educational handout provided to  patient.    Gastrointestinal:     -IBS- takes bentyl as needed  Recreational drug use: Drug use No  Alcohol use glass of wine with dinner    Apfel: 2 points 39% risk for post operative N/V    Infectious disease:    No diagnosis or significant findings on chart review or clinical presentation and evaluation.   Prescription provided for CHG body wash and dental rinse. CHG use instructions reviewed and provided to patient. Patient advised to call [facility] if rx not received.   Staph screen collected-Pending    Musculoskeletal:    -osteoarthritis of left knee- scheduled for surgery    Anesthesia:  ASA 2 - Patient with mild systemic disease with no functional limitations    History of General anesthesia- yes  Complications- No anesthesia complications  No family history of anesthesia complications    Abnormalities noted on PAT evaluation: No    Nickel/metal allergy-negative  Shellfish allergy-negative    Discussed with patient medication instructions, NPO guidelines, and any questions or concerns. Patient does not need further workup prior to preceding with elective surgery based on based on risk assessment.       Felisha Jaramillo PA-C 12/19/2024 11:36 AM      Pending labs ordered:  cbc, comp, A1c, and MSSA/MRSA culture  Follow up needed: labs

## 2024-12-19 NOTE — CPM/PAT H&P
"CPM/PAT Evaluation       Name: Caroline COY Scurfield (Caroline COY Scurfield \"Iqra\")  /Age: 1955/69 y.o.     Visit Type:   In-Person       Chief Complaint: left knee pain    HPI: Patient is a 68 yo F scheduled for left total knee arthroplasty on 01/10/2025 with Dr. Hamilton secondary to left knee osteoarthritis. Patient was referred by Dr. Hamilton to CPM today for perioperative risk stratification and optimization. Patient's PMHx is notable for anxiety, HTN, IBS, thyroid disease, osteoarthritis      Past Medical History:   Diagnosis Date    Anxiety     Arthritis May 2019    Hypertension     Hyperthyroidism     Irritable bowel syndrome 10 years    Obesity ?    Osteoarthritis of knee     Tear of meniscus of knee May 2019    Unilateral primary osteoarthritis, unspecified knee     Arthritis of knee       Past Surgical History:   Procedure Laterality Date    BREAST CYST EXCISION Right     RT excisional biopsy, benign    HYSTERECTOMY      KNEE ARTHROPLASTY Right     MYOMECTOMY      OTHER SURGICAL HISTORY Right 2020    Meniscus repair       Patient  reports that she is not currently sexually active and has had partner(s) who are male. She reports using the following method of birth control/protection: Post-menopausal.    Family History   Problem Relation Name Age of Onset    Hypertension Father Saul     Hearing loss Father Saul     Heart disease Father Saul     Breast cancer Mother's Sister      Ovarian cancer Cousin      Depression Mother Weimar     Miscarriages / Stillbirths Mother [place]     Stroke Mother [place]     Diabetes Brother Philipp     Kidney disease Brother Philipp     Stroke Brother Philipp     Cancer Brother Fredo        Allergies   Allergen Reactions    Tetracycline Rash       Prior to Admission medications    Medication Sig Start Date End Date Taking? Authorizing Provider   dicyclomine (Bentyl) 20 mg tablet Take 1 tablet (20 mg) by mouth 4 times a day as needed (abdominal pain or cramps). 24 " Yes Kajal Chery DO   ibuprofen 200 mg tablet Take by mouth every 6 hours if needed for mild pain (1 - 3).   Yes Historical Provider, MD   thyroid, pork, 32.5 mg tablet Take 1 each by mouth once daily. 11/6/24  Yes Kajal Chery DO   valsartan-hydrochlorothiazide (Diovan-HCT) 160-12.5 mg tablet Take 1 tablet by mouth once daily. 11/6/24  Yes Kajal Chery DO   tiZANidine (Zanaflex) 4 mg tablet Take 1 tablet (4 mg) by mouth once daily at bedtime. 11/18/24 12/18/24  Kajal Chery DO        PAT ROS:   Constitutional:   neg    Neuro/Psych:   neg    Eyes:   neg    Ears:   Nose:   neg     nasal discharge  Mouth:   neg    Throat:   neg    Neck:   neg    Cardio:   neg    Respiratory:   neg    Endocrine:   neg    GI:   neg    :   neg    Musculoskeletal:   neg    Hematologic:   neg    Skin:  neg        Physical Exam  Vitals and nursing note reviewed.   Constitutional:       General: She is not in acute distress.     Appearance: She is not ill-appearing or toxic-appearing.   HENT:      Head: Normocephalic and atraumatic.      Nose: Nose normal.      Mouth/Throat:      Mouth: Mucous membranes are moist.   Eyes:      Extraocular Movements: Extraocular movements intact.      Conjunctiva/sclera: Conjunctivae normal.   Neck:      Vascular: No carotid bruit.   Cardiovascular:      Rate and Rhythm: Normal rate and regular rhythm.      Pulses: Normal pulses.      Heart sounds: Normal heart sounds. No murmur heard.  Pulmonary:      Effort: Pulmonary effort is normal.      Breath sounds: Normal breath sounds.   Abdominal:      General: There is no distension.      Palpations: Abdomen is soft.      Tenderness: There is no abdominal tenderness.   Musculoskeletal:         General: Normal range of motion.      Cervical back: Normal range of motion.   Skin:     General: Skin is warm and dry.      Capillary Refill: Capillary refill takes less than 2 seconds.   Neurological:      General: No focal deficit present.      Mental  "Status: She is alert.   Psychiatric:         Mood and Affect: Mood normal.         Behavior: Behavior normal.          PAT AIRWAY:   Airway:     Mallampati::  II    TM distance::  >3 FB    Neck ROM::  Full          Visit Vitals  /88   Pulse 89   Temp 36.6 °C (97.9 °F) (Temporal)   Resp 14   Ht 1.727 m (5' 8\")   Wt 102 kg (224 lb 6.9 oz)   SpO2 96%   BMI 34.12 kg/m²   OB Status Hysterectomy   Smoking Status Never   BSA 2.21 m²       DASI Risk Score      Flowsheet Row Questionnaire Series Submission from 12/17/2024 in [facility] with Generic Provider Joie   Can you take care of yourself (eat, dress, bathe, or use toilet)?  2.75  filed at 12/17/2024 0825   Can you walk indoors, such as around your house? 1.75  filed at 12/17/2024 0825   Can you walk a block or two on level ground?  2.75  filed at 12/17/2024 0825   Can you climb a flight of stairs or walk up a hill? 5.5  filed at 12/17/2024 0825   Can you run a short distance? 0  filed at 12/17/2024 0825   Can you do light work around the house like dusting or washing dishes? 2.7  filed at 12/17/2024 0825   Can you do moderate work around the house like vacuuming, sweeping floors or carrying groceries? 3.5  filed at 12/17/2024 0825   Can you do heavy work around the house like scrubbing floors or lifting and moving heavy furniture?  0  filed at 12/17/2024 0825   Can you do yard work like raking leaves, weeding or pushing a mower? 0  filed at 12/17/2024 0825   Can you have sexual relations? 5.25  filed at 12/17/2024 0825   Can you participate in moderate recreational activities like golf, bowling, dancing, doubles tennis or throwing a baseball or football? 6  filed at 12/17/2024 0825   Can you participate in strenous sports like swimming, singles tennis, football, basketball, or skiing? 0  filed at 12/17/2024 0825   DASI SCORE 30.2  filed at 12/17/2024 0825   METS Score (Will be calculated only when all the questions are answered) 6.5  filed at 12/17/2024 0825 "          Caprini DVT Assessment      Flowsheet Row Pre-Admission Testing from 12/19/2024 in [facility]   DVT Score 10 filed at 12/19/2024 1103   Surgical Factors Elective major lower extremity arthroplasty filed at 12/19/2024 1103   BMI 31-40 (Obesity) filed at 12/19/2024 1103          Modified Frailty Index      Flowsheet Row Pre-Admission Testing from 12/19/2024 in [facility]   Non-independent functional status (problems with dressing, bathing, personal grooming, or cooking) 0 filed at 12/19/2024 1104   History of diabetes mellitus  0 filed at 12/19/2024 1104   History of COPD 0 filed at 12/19/2024 1104   History of CHF No filed at 12/19/2024 1104   History of MI 0 filed at 12/19/2024 1104   History of Percutaneous Coronary Intervention, Cardiac Surgery, or Angina No filed at 12/19/2024 1104   Hypertension requiring the use of medication  0.0909 filed at 12/19/2024 1104   Peripheral vascular disease 0 filed at 12/19/2024 1104   Impaired sensorium (cognitive impairement or loss, clouding, or delirium) 0 filed at 12/19/2024 1104   TIA or CVA withouy residual deficit 0 filed at 12/19/2024 1104   Cerebrovascular accident with deficit 0 filed at 12/19/2024 1104   Modified Frailty Index Calculator .0909 filed at 12/19/2024 1104          CHADS2 Stroke Risk  Current as of yesterday        N/A 3 to 100%: High Risk   2 to < 3%: Medium Risk   0 to < 2%: Low Risk     Last Change: N/A          This score determines the patient's risk of having a stroke if the patient has atrial fibrillation.        This score is not applicable to this patient. Components are not calculated.          Revised Cardiac Risk Index      Flowsheet Row Pre-Admission Testing from 12/19/2024 in [facility]   High-Risk Surgery (Intraperitoneal, Intrathoracic,Suprainguinal vascular) 0 filed at 12/19/2024 1103   History of ischemic heart disease (History of MI, History of positive exercuse test, Current  chest paint considered due to myocardial ischemia, Use of nitrate therapy, ECG with pathological Q Waves) 0 filed at 12/19/2024 1103   History of congestive heart failure (pulmonary edemia, bilateral rales or S3 gallop, Paroxysmal nocturnal dyspnea, CXR showing pulmonary vascular redistribution) 0 filed at 12/19/2024 1103   History of cerebrovascular disease (Prior TIA or stroke) 0 filed at 12/19/2024 1103   Pre-operative insulin treatment 0 filed at 12/19/2024 1103   Pre-operative creatinine>2 mg/dl 0 filed at 12/19/2024 1103   Revised Cardiac Risk Calculator 0 filed at 12/19/2024 1103          Apfel Simplified Score      Flowsheet Row Pre-Admission Testing from 12/19/2024 in [facility]   Smoking status 1 filed at 12/19/2024 1103   History of motion sickness or PONV  0 filed at 12/19/2024 1103   Use of postoperative opioids 0 filed at 12/19/2024 1103   Gender - Female 1=Yes filed at 12/19/2024 1103   Apfel Simplified Score Calculator 2 filed at 12/19/2024 1103          Risk Analysis Index Results This Encounter    No data found in the last 10 encounters.       Stop Bang Score      Flowsheet Row Pre-Admission Testing from 12/19/2024 in [facility] Questionnaire Series Submission from 12/17/2024 in [facility] with Generic Provider Joie   Do you snore loudly? 0 filed at 12/19/2024 1033 0  filed at 12/17/2024 0825   Do you often feel tired or fatigued after your sleep? 0 filed at 12/19/2024 1033 0  filed at 12/17/2024 0825   Has anyone ever observed you stop breathing in your sleep? 0 filed at 12/19/2024 1033 0  filed at 12/17/2024 0825   Do you have or are you being treated for high blood pressure? 1 filed at 12/19/2024 1033 1  filed at 12/17/2024 0825   Recent BMI (Calculated) 34.1 filed at 12/19/2024 1033 34.4  filed at 12/17/2024 0825   Is BMI greater than 35 kg/m2? 0=No filed at 12/19/2024 1033 0=No  filed at 12/17/2024 0825   Age older than 50 years old? 1=Yes filed at  12/19/2024 1033 1=Yes  filed at 12/17/2024 0825   Is your neck circumference greater than 17 inches (Male) or 16 inches (Female)? 1 filed at 12/19/2024 1033 --   Gender - Male 0=No filed at 12/19/2024 1033 0=No  filed at 12/17/2024 0825   STOP-BANG Total Score 3 filed at 12/19/2024 1033 --          Prodigy: High Risk  Total Score: 8              Prodigy Age Score           ARISCAT Score for Postoperative Pulmonary Complications      Flowsheet Row Pre-Admission Testing from 12/19/2024 in [facility]   Age, years  3 filed at 12/19/2024 1044   Preoperative SpO2 0 filed at 12/19/2024 1044   Respiratory infection in the last month Either upper or lower (i.e., URI, bronchitis, pneumonia), with fever and antibiotic treatment 0 filed at 12/19/2024 1044   Preoperative anemoa (Hgb less than 10 g/dl) 0 filed at 12/19/2024 1044   Surgical incision  0 filed at 12/19/2024 1044   Duration of surgery  16 filed at 12/19/2024 1044   Emergency Procedure  0 filed at 12/19/2024 1044   ARISCAT Total Score  19 filed at 12/19/2024 1044          Zari Perioperative Risk for Myocardial Infarction or Cardiac Arrest (DECLAN)      Flowsheet Row Pre-Admission Testing from 12/19/2024 in [facility]   Age 1.38 filed at 12/19/2024 1104   Functional Status  0 filed at 12/19/2024 1104   ASA Class  -3.29 filed at 12/19/2024 1104   Creatinine 0 filed at 12/19/2024 1104   Type of Procedure  0.80 filed at 12/19/2024 1104   DECLAN Total Score  -6.36 filed at 12/19/2024 1104   DECLAN % 0.17 filed at 12/19/2024 1104            Assessment & Plan    Neuro:    -Anxiety- not currently on meds, denies any symptoms at this time  No diagnosis or significant findings on chart review or clinical presentation and evaluation.  Preoperative brain exercise educational handout provided to patient.    The patient is at an increased risk for perioperative stroke secondary to increased age, HTN, and female sex .    HEENT/Airway:   No diagnosis or  significant findings on chart review or clinical presentation and evaluation.   STOP-BANG Score- 3 points moderaterisk for SIMA    Mallampati::  II    TM distance::  >3 FB    Neck ROM::  Full  Dentures-denies  Crowns-reports multiple  Implants-denies    Cardiovascular:    -HTN - on valsartan-hydrochlorothiazide (24 hour hold)  No additional preoperative testing is currently indicated.  METS: 6.5  RCRI: 0 points, 3.9%    30 day risk of MACE (risk for cardiac death, nonfatal myocardial infarction, and nonfactal cardiac arrest  DECLAN:   0.17 % risk of intraoperative or 30-day postoperative MACE  EKG -normal sinus rhythm, incomplete RBBB  Rate- 85; No acute changes when compared to EKG from 12/03/2020    Pulmonary:     No diagnosis or significant findings on chart review or clinical presentation and evaluation.   ARISCAT: <26 points, 1.6% risk of in-hospital postoperative pulmonary complication  PRODIGY: Moderate risk for opioid induced respiratory depression  Smoking History-She has never smoked.  Preoperative deep breathing educational handout provided to patient.    Renal:  No diagnosis or significant findings on chart review or clinical presentation and evaluation, however, the patient is at increased risk of perioperative renal complications secondary to age>/= 56 and HTN. Preventative measures include BP monitoring, medication compliance, and hydration management.   CMP-Pending    Endocrine:    -Thyroid disease- takes thyroid , last TSH 7/29/2024 was WNL    Hematologic:    No diagnosis or significant findings on chart review or clinical presentation and evaluation.  The patient is not a Jehovah’s witness and will accept blood and blood products if medically indicated.   History of previous blood transfusions No  CBC-Pending    Caprini Score 10, patient at High for postoperative DVT. Pt supplied education/VTE handout  Anticoagulation use: No   Preoperative DVT educational handout provided to  patient.    Gastrointestinal:     -IBS- takes bentyl as needed  Recreational drug use: Drug use No  Alcohol use glass of wine with dinner    Apfel: 2 points 39% risk for post operative N/V    Infectious disease:    No diagnosis or significant findings on chart review or clinical presentation and evaluation.   Prescription provided for CHG body wash and dental rinse. CHG use instructions reviewed and provided to patient. Patient advised to call [facility] if rx not received.   Staph screen collected-Pending    Musculoskeletal:    -osteoarthritis of left knee- scheduled for surgery    Anesthesia:  ASA 2 - Patient with mild systemic disease with no functional limitations    History of General anesthesia- yes  Complications- No anesthesia complications  No family history of anesthesia complications    Abnormalities noted on PAT evaluation: No    Nickel/metal allergy-negative  Shellfish allergy-negative    Discussed with patient medication instructions, NPO guidelines, and any questions or concerns. Patient does not need further workup prior to preceding with elective surgery based on based on risk assessment.       Felisha Jaramillo PA-C 12/19/2024 11:36 AM      Pending labs ordered:  cbc, comp, A1c, and MSSA/MRSA culture  Follow up needed: labs

## 2024-12-19 NOTE — PREPROCEDURE INSTRUCTIONS
Medication List            Accurate as of December 19, 2024 10:47 AM. Always use your most recent med list.                * chlorhexidine 0.12 % solution  Commonly known as: Peridex  Swish and spit 15 mL night before surgery and morning of surgery     * chlorhexidine 4 % external liquid  Commonly known as: Hibiclens  Apply topically once daily for 5 days.     dicyclomine 20 mg tablet  Commonly known as: Bentyl  Take 1 tablet (20 mg) by mouth 4 times a day as needed (abdominal pain or cramps).  Medication Adjustments for Surgery: Do Not take on the morning of surgery     ibuprofen 200 mg tablet  Additional Medication Adjustments for Surgery: Take last dose 7 days before surgery  Notes to patient: Last dose 01/02/2025     thyroid (pork) 32.5 mg tablet  Take 1 each by mouth once daily.  Medication Adjustments for Surgery: Take/Use as prescribed     tiZANidine 4 mg tablet  Commonly known as: Zanaflex  Take 1 tablet (4 mg) by mouth once daily at bedtime.  Medication Adjustments for Surgery: Take/Use as prescribed     valsartan-hydrochlorothiazide 160-12.5 mg tablet  Commonly known as: Diovan-HCT  Take 1 tablet by mouth once daily.  Medication Adjustments for Surgery: Take last dose 1 day (24 hours) before surgery  Notes to patient: Last dose morning 01/09/2025           * This list has 2 medication(s) that are the same as other medications prescribed for you. Read the directions carefully, and ask your doctor or other care provider to review them with you.                            Thank you for visiting [facility].  If you have any changes to your health condition, please call the surgeons office to alert them and give them details of your symptoms.    Felisha Jaramillo PA-C  P: [phone]  Department of Anesthesiology and Perioperative Medicine  --    Preoperative Fasting Guidelines    Why must I stop eating and drinking near surgery time?  With sedation, food or liquid in your stomach can  enter your lungs causing serious complications  Increases nausea and vomiting    When do I need to stop eating and drinking before my surgery?  Do not eat any food after midnight the night before your surgery/procedure.  You may have up to 13.5 ounces of clear liquid until TWO hours before your instructed arrival time to the hospital.  This includes water, black tea/coffee, (no milk or cream) apple juice, and electrolyte drinks (Gatorade)  You may chew gum until TWO hours before your surgery/procedure              Preoperative Brain Exercises    What are brain exercises?  A brain exercise is any activity that engages your thinking (cognitive) skills.    What types of activities are considered brain exercises?  Jigsaw puzzles, crossword puzzles, word jumble, memory games, word search, and many more.  Many can be found free online or on your phone via a mobile bereket.    Why should I do brain exercises before my surgery?  More recent research has shown brain exercise before surgery can lower the risk of postoperative delirium (confusion) which can be especially important for older adults.  Patients who did brain exercises for 5 to 10 hours the days before surgery, cut their risk of postoperative delirium in half up to 1 week after surgery.                  [facility]    Preoperative Deep Breathing Exercises    Why it is important to do deep breathing exercises before my surgery?  Deep breathing exercises strengthen your breathing muscles.  This helps you to recover after your surgery and decreases the chance of breathing complications.      How are the deep breathing exercises done?  Sit straight with your back supported.  Breathe in deeply and slowly through your nose. Your lower rib cage should expand and your abdomen may move forward.  Hold that breath for 3 to 5 seconds.  Breathe out through pursed lips, slowly and completely.  Rest and repeat 10 times every hour while awake.  Rest longer  if you become dizzy or lightheaded.      Patient Information: Incentive Spirometer  What is an incentive spirometer?  An incentive spirometer is a device used before and after surgery to “exercise” your lungs.  It helps you to take deeper breaths to expand your lungs.  Below is an example of a basic incentive spirometer.  The device you receive may differ slightly but they all function the same.    Why do I need to use an incentive spirometer?  Using your incentive spirometer prepares your lungs for surgery and helps prevent lung problems after surgery.  How do I use my incentive spirometer?  When you're using your incentive spirometer, make sure to breathe through your mouth. If you breathe through your nose, the incentive spirometer won't work properly. You can hold your nose if you have trouble.  If you feel dizzy at any time, stop and rest. Try again at a later time.  Follow the steps below:  Set up your incentive spirometer, expand the flexible tubing and connect to the outlet.  Sit upright in a chair or bed. Hold the incentive spirometer at eye level.   Put the mouthpiece in your mouth and close your lips tightly around it. Slowly breathe out (exhale) completely.  Breathe in (inhale) slowly through your mouth as deeply as you can. As you take a breath, you will see the piston rise inside the large column. While the piston rises, the indicator should move upwards. It should stay in between the 2 arrows (see Figure).  Try to get the piston as high as you can, while keeping the indicator between the arrows.   If the indicator doesn't stay between the arrows, you're breathing either too fast or too slow.  When you get it as high as you can, hold your breath for 10 seconds, or as long as possible. While you're holding your breath, the piston will slowly fall to the base of the spirometer.  Once the piston reaches the bottom of the spirometer, breathe out slowly through your mouth. Rest for a few seconds.  Repeat 10  times. Try to get the piston to the same level with each breath.  Repeat every hour while awake  You can carefully clean the outside of the mouthpiece with an alcohol wipe or soap and water.            Patient and Family Education             Ways You Can Help Prevent Blood Clots             This handout explains some simple things you can do to help prevent blood clots.      Blood clots are blockages that can form in the body's veins. When a blood clot forms in your deep veins, it may be called a deep vein thrombosis, or DVT for short. Blood clots can happen in any part of the body where blood flows, but they are most common in the arms and legs. If a piece of a blood clot breaks free and travels to the lungs, it is called a pulmonary embolus (PE). A PE can be a very serious problem.         Being in the hospital or having surgery can raise your chances of getting a blood clot because you may not be well enough to move around as much as you normally do.         Ways you can help prevent blood clots in the hospital         Wearing SCDs. SCDs stands for Sequential Compression Devices.   SCDs are special sleeves that wrap around your legs  They attach to a pump that fills them with air to gently squeeze your legs every few minutes.   This helps return the blood in your legs to your heart.   SCDs should only be taken off when walking or bathing.   SCDs may not be comfortable, but they can help save your life.               Wearing compression stockings - if your doctor orders them. These special snug fitting stockings gently squeeze your legs to help blood flow.       Walking. Walking helps move the blood in your legs.   If your doctor says it is ok, try walking the halls at least   5 times a day. Ask us to help you get up, so you don't fall.      Taking any blood thinning medicines your doctor orders.             ©[facility]; 3/23       Ways you can help prevent blood clots at home       Wearing compression  stockings - if your doctor orders them. ? Walking - to help move the blood in your legs.       Taking any blood thinning medicines your doctor orders.      Signs of a blood clot or PE      Tell your doctor or nurse know right away if you have of the problems listed below.    If you are at home, seek medical care right away. Call 911 for chest pain or problems breathing.               Signs of a blood clot (DVT) - such as pain,  swelling, redness or warmth in your arm or leg      Signs of a pulmonary embolism (PE) - such as chest     pain or feeling short of breath           The Week before Surgery        Seven days before Surgery  Check your [facility] medication instructions  Do the exercises provided to you by [facility]   Arrange for a responsible, adult licensed  to take you home after surgery and stay with you for 24 hours.  You will not be permitted to drive yourself home if you have received any anesthetic/sedation  Six days before surgery  Check your [facility] medication instructions  Do the exercises provided to you by [facility]   Start using Chlorhexidene (CHG) body wash if prescribed  Five days before surgery  Check your [facility] medication instructions  Do the exercises provided to you by [facility]   Continue to use CHG body wash if prescribed  Three days before surgery  Check your [facility] medication instructions  Do the exercises provided to you by [facility]   Continue to use CHG body wash if prescribed  Two days before surgery  Check your [facility] medication instructions  Do the exercises provided to you by [facility]   Continue to use CHG body wash if prescribed    The Day before Surgery       Check your [facility] medication and all other [facility] instructions including when to stop eating and drinking  You will be called with your arrival time for surgery in the late afternoon.  If you do not receive a call please reach out to your surgeon's office.  Do not smoke or drink 24 hours before surgery  Prepare items to bring with you to the hospital  Shower with your  chlorhexidine wash if prescribed  Brush your teeth and use your chlorhexidine dental rinse if prescribed    The Day of Surgery       Check your [facility] medication instructions  Ensure you follow the instructions for when to stop eating and drinking  Shower, if prescribed use CHG.  Do not apply any lotions, creams, moisturizers, perfume or deodorant  Brush your teeth and use your CHG dental rinse if prescribed  Wear loose comfortable clothing  Avoid make-up  Remove  jewelry and piercings, consider professional piercing removal with a plastic spacer if needed  Bring photo ID and Insurance card  Bring an accurate medication list that includes medication dose, frequency and allergies  Bring a copy of your advanced directives (will, health care power of )  Bring any devices and controllers as well as medical devices you have been provided with for surgery (CPAP, slings, braces, etc.)  Dentures, eyeglasses, and contacts will be removed before surgery, please bring cases for contacts or glasses        Patient Information: Pre-Operative Infection Prevention Measures     Why did I have my nose, under my arms, and groin swabbed?  The purpose of the swab is to identify Staphylococcus aureus inside your nose or on your skin.  The swab was sent to the laboratory for culture.  A positive swab/culture for Staphylococcus aureus is called colonization or carriage.      What is Staphylococcus aureus?  Staphylococcus aureus, also known as “staph”, is a germ found on the skin or in the nose of healthy people.  Sometimes Staphylococcus aureus can get into the body and cause an infection.  This can be minor (such as pimples, boils, or other skin problems).  It might also be serious (such as a blood infection, pneumonia, or a surgical site infection).    What is Staphylococcus aureus colonization or carriage?  Colonization or carriage means that a person has the germ but is not sick from it.  These bacteria can be spread on the hands  or when breathing or sneezing.    How is Staphylococcus aureus spread?  It is most often spread by close contact with a person or item that carries it.    What happens if my culture is positive for Staphylococcus aureus?  Your doctor/medical team will use this information to guide any antibiotic treatment which may be necessary.  Regardless of the culture results, we will clean the inside of your nose with a betadine swab just before you have your surgery.      Will I get an infection if I have Staphylococcus aureus in my nose or on my skin?  Anyone can get an infection with Staphylococcus aureus.  However, the best way to reduce your risk of infection is to follow the instructions provided to you for the use of your CHG soap and dental rinse.        Patient Information: Oral/Dental Rinse    What is oral/dental rinse?   It is a mouthwash. It is a way of cleaning the mouth with a germ-killing solution before your surgery.  The solution contains chlorhexidine, commonly known as CHG.   It is used inside the mouth to kill a bacteria known as Staphylococcus aureus.  Let your doctor know if you are allergic to Chlorhexidine.    Why do I need to use CHG oral/dental rinse?  The CHG oral/dental rinse helps to kill a bacteria in your mouth known as Staphylococcus aureus.     This reduces the risk of infection at the surgical site.      Using your CHG oral/dental rinse  STEPS:  Use your CHG oral/dental rinse after you brush your teeth the night before (at bedtime) and the morning of your surgery.  Follow all directions on your prescription label.    Use the cap on the container to measure 15ml   Swish (gargle if you can) the mouthwash in your mouth for at least 30 seconds, (do not swallow) and spit out  After you use your CHG rinse, do not rinse your mouth with water, drink or eat.  Please refer to the prescription label for the appropriate time to resume oral intake      What side effects might I have using the CHG  oral/dental rinse?  CHG rinse will stick to plaque on the teeth.  Brush and floss just before use.  Teeth brushing will help avoid staining of plaque during use.      Patient Information: Home Preoperative Antibacterial Shower      What is a home preoperative antibacterial shower?  This shower is a way of cleaning the skin with a germ-killing solution before surgery.  The solution contains chlorhexidine, commonly known as CHG.  CHG is a skin cleanser with germ-killing ability.  Let your doctor know if you are allergic to chlorhexidine.    Why do I need to take a preoperative antibacterial shower?  Skin is not sterile.  It is best to try to make your skin as free of germs as possible before surgery.  Proper cleansing with a germ-killing soap before surgery can lower the number of germs on your skin.  This helps to reduce the risk of infection at the surgical site.  Following the instructions listed below will help you prepare your skin for surgery.      How do I use the solution?  Steps:  Begin using your CHG soap 5 days before your scheduled surgery on ___01/06/2025_____________________.    First, wash and rinse your hair using the CHG soap. Keep CHG soap away from ear canals and eyes.  Rinse completely, do not condition.  Hair extensions should be removed.  Wash your face with your normal soap and rinse.    Apply the CHG solution to a clean wet washcloth.  Turn the water off or move away from the water spray to avoid premature rinsing of the CHG soap as you are applying.   Firmly lather your entire body from the neck down.  Do not use on your face.  Pay special attention to the area(s) where your incision(s) will be located unless they are on your face.  Avoid scrubbing your skin too hard.  The important point is to have the CHG soap sit on your skin for 3 minutes.    When the 3 minutes are up, turn on the water and rinse the CHG solution off your body completely.   DO NOT wash with regular soap after you have used  the CHG soap solution  Pat yourself dry with a clean, freshly-laundered towel.  DO NOT apply powders, deodorants, or lotions.  Dress in clean, freshly laundered nightclothes.    Be sure to sleep with clean, freshly laundered sheets.  Be aware that CHG will cause stains on fabrics; if you wash them with bleach after use.  Rinse your washcloth and other linens that have contact with CHG completely.  Use only non-chlorine detergents to launder the items used.   The morning of surgery is the fifth day.  Repeat the above steps and dress in clean comfortable clothing     Whom should I contact if I have any questions regarding the use of CHG soap?  Call the [facility]

## 2024-12-20 LAB
EST. AVERAGE GLUCOSE BLD GHB EST-MCNC: 100 MG/DL
HBA1C MFR BLD: 5.1 %

## 2024-12-21 LAB — STAPHYLOCOCCUS SPEC CULT: NORMAL

## 2025-01-02 ENCOUNTER — TELEPHONE (OUTPATIENT)
Dept: ORTHOPEDIC SURGERY | Facility: CLINIC | Age: 70
End: 2025-01-02
Payer: MEDICARE

## 2025-01-02 DIAGNOSIS — M17.11 PRIMARY OSTEOARTHRITIS OF RIGHT KNEE: ICD-10-CM

## 2025-01-02 DIAGNOSIS — M17.12 PRIMARY OSTEOARTHRITIS OF LEFT KNEE: ICD-10-CM

## 2025-01-06 ENCOUNTER — TELEPHONE (OUTPATIENT)
Dept: ORTHOPEDIC SURGERY | Facility: HOSPITAL | Age: 70
End: 2025-01-06
Payer: MEDICARE

## 2025-01-06 NOTE — TELEPHONE ENCOUNTER
Thank you for taking my call today.  All questions were answered at the time of the call, but please feel free to reach out to me via [employer] or phone, [phone], with any new questions or concerns.       We confirmed that you opted to enroll in our [id number] program so your discharge prescriptions will be available to take home at the time of discharge.  Please bring any prescription insurance coverage with you on the morning of surgery so that we can enter the information into our system.     We confirmed that your plan would be to Discharge Home same day ([facility]).    We confirmed that you have DME needed for recovery.     Use the provided body wash for 4 days before surgery and complete a 5th shower on the morning of surgery, this includes your body and hair.  Follow the directions as provided during preadmission testing.  The mouth wash will be used the night before and the morning of surgery.       As a reminder, if you do not hear from our team, please call [phone] between 2pm and 3pm the business day before your surgery to confirm your arrival time and details.        Please don't hesitate to reach out with additional questions or concerns.    Cata Rain MBA, BSN, RN-BC  [facility]  [facility]  [phone]

## 2025-01-09 DIAGNOSIS — M17.12 PRIMARY OSTEOARTHRITIS OF LEFT KNEE: Primary | ICD-10-CM

## 2025-01-09 RX ORDER — OXYCODONE HYDROCHLORIDE 5 MG/1
5 TABLET ORAL EVERY 6 HOURS PRN
Qty: 28 TABLET | Refills: 0 | Status: SHIPPED | OUTPATIENT
Start: 2025-01-09 | End: 2025-01-09 | Stop reason: SDUPTHER

## 2025-01-09 RX ORDER — PANTOPRAZOLE SODIUM 40 MG/1
40 TABLET, DELAYED RELEASE ORAL DAILY PRN
Qty: 30 TABLET | Refills: 0 | Status: SHIPPED | OUTPATIENT
Start: 2025-01-09 | End: 2025-01-10

## 2025-01-09 RX ORDER — OXYCODONE HYDROCHLORIDE 5 MG/1
5 TABLET ORAL EVERY 6 HOURS PRN
Qty: 28 TABLET | Refills: 0 | Status: ON HOLD | OUTPATIENT
Start: 2025-01-09 | End: 2025-01-10

## 2025-01-09 RX ORDER — TRANEXAMIC ACID 100 MG/ML
1000 INJECTION, SOLUTION INTRAVENOUS 2 TIMES DAILY
OUTPATIENT
Start: 2025-01-09 | End: 2025-01-10

## 2025-01-09 RX ORDER — CYCLOBENZAPRINE HCL 5 MG
5 TABLET ORAL 3 TIMES DAILY PRN
Qty: 30 TABLET | Refills: 0 | Status: SHIPPED | OUTPATIENT
Start: 2025-01-09 | End: 2025-01-10

## 2025-01-09 RX ORDER — ACETAMINOPHEN 325 MG/1
1000 TABLET ORAL EVERY 8 HOURS PRN
Qty: 90 TABLET | Refills: 1 | Status: SHIPPED | OUTPATIENT
Start: 2025-01-09 | End: 2025-01-10

## 2025-01-09 RX ORDER — TRAMADOL HYDROCHLORIDE 50 MG/1
50 TABLET ORAL EVERY 6 HOURS PRN
Qty: 28 TABLET | Refills: 0 | Status: ON HOLD | OUTPATIENT
Start: 2025-01-09 | End: 2025-01-10

## 2025-01-09 RX ORDER — TRAMADOL HYDROCHLORIDE 50 MG/1
50 TABLET ORAL EVERY 6 HOURS PRN
Qty: 28 TABLET | Refills: 0 | Status: SHIPPED | OUTPATIENT
Start: 2025-01-09 | End: 2025-01-09 | Stop reason: SDUPTHER

## 2025-01-09 RX ORDER — SENNOSIDES 8.6 MG/1
1 TABLET ORAL 2 TIMES DAILY
Qty: 60 TABLET | Refills: 0 | Status: SHIPPED | OUTPATIENT
Start: 2025-01-09 | End: 2025-02-08

## 2025-01-09 RX ORDER — NAPROXEN SODIUM 220 MG/1
81 TABLET, FILM COATED ORAL 2 TIMES DAILY
Qty: 60 TABLET | Refills: 0 | Status: SHIPPED | OUTPATIENT
Start: 2025-01-09 | End: 2025-01-10

## 2025-01-09 RX ORDER — CELECOXIB 200 MG/1
200 CAPSULE ORAL 2 TIMES DAILY
Qty: 60 CAPSULE | Refills: 0 | Status: SHIPPED | OUTPATIENT
Start: 2025-01-09 | End: 2025-01-10

## 2025-01-09 NOTE — DISCHARGE INSTRUCTIONS
Sofia Hamilton,   Phone: [phone] - [place], Medical Assistant    PLEASE READ CAREFULLY BEFORE CONTACTING YOUR PROVIDER.    WE WORK COLLABORATIVELY AS A TEAM. CALLING MULTIPLE STAFF MEMBERS REGARDING THE SAME ISSUE WILL DELAY YOUR CARE.  MYCHART IS THE PREFERRED COMMUNICATION FOR ALL TEAM MEMBERS.  ________________________________________________________________________________________________________________________________________________________________________    After Surgery Instructions: TOTAL KNEE ARTHROPLASTY    The following is a general guide and may be applied to most patients that go home from the hospital after surgery. If you go to a rehab facility the timeline may deviate a little. Please do not hesitate to call our office for any questions or additional guidance. We will work with you to get the best experience from your new joint replacement.     WEEK 1  Relax…Don't Overdo it!  - WEIGHT BEARING: As tolerated, unless otherwise specified  - Get up once an hour for small activities. (get a drink, go to the bathroom, etc.)  - Keep the surgical site iced and elevated above your heart, if possible, while you are resting.  - You will have some light exercises given to you from the physical therapist in the hospital. Work diligently on your range of motion.  - BANDAGE CARE: REMOVE YOUR BANDAGE AT HOME 7 DAYS AFTER SURGERY    DO NOT place a pillow under the knee for comfort  DO NOT sleep or rest in a recliner if you are able to get in your bed  DO NOT wait until you start therapy to bend the knee.  The sooner the better! (work within your pain tolerance).    All of this may sound scary but knees can get stiff easily and we want you to get your range of motion back as quickly as possible!    SWELLING AND BRUISING  BRUISING AND SWELLING AFTER SURGERY IS NORMAL. As the patient becomes more mobile the bruising and swelling will begin to migrate towards the ankle and foot and is usually noticed toward the  end of the day.    WEEK 2-3  You will have a prescription for physical therapy given to you or electronically prescribed and you should start outpatient therapy 7-10 days after your operation. Be sure to do the home exercises on the days you are not attending physical therapy.    - Continue to progress walking as tolerated.   - Continue to work on range of motion exercises at home with a goal of 0-120 degrees by 12 weeks post operative  - Continue to use ice for soreness on the surgical site  - You may wean from your walker to a cane when you feel safe and comfortable to do so. Your physical therapist will also be helpful with progressing your assistive device.   - Be careful with bending and twisting - If it hurts, stop!!    FOLLOW UP  - Your first post-operative visit with Dr. Sofia Hamilton should have been scheduled already. Please call [phone] for appointment questions  - You do not need new xrays for this visit  - Don't be nervous! This visit is to see how you are doing and make sure your incision is healing nicely and have begun working with physical therapy.       MEDICATION REFILLS - Please call main office number: [phone]    You will not receive a call indicating that your prescription has been filled.  Please contact your pharmacy with any questions.    Medication refills will be filled Monday-Friday 7am to 1pm ONLY. Please call the office or send a [employer]Ante message for a refill request.  Any requests received outside of this timeframe will be handled on the next business day.  The office staff and orthopedic nurses cannot refill medications; messages should be left directly through the office or via my chart.  Please do not call multiple times or call other members of the care team for medication needs, this will cause the refill to take longer.    Per State and Institutional policy, pain medications can only be refilled every 7 days for up to six weeks following surgery.    DRIVING & TRAVEL  AFTER SURGERY   Patients should anticipate waiting at least 3-6 weeks before traveling long distances after surgery.  At minimum you cannot be using your walker before considering driving and you cannot take any narcotic medications prior to driving. You will need to stop to walk around ever 1 hour during your travel to help with blood clot prevention.  Please call the office or your joint nurse to discuss prior to post-surgical travel.  Patients may not drive until cleared by the joint nurse or the office.    DENTAL PROCEDURES & CLEANINGS  You must wait a minimum of 3 months for elective dental appointments (if possible, we understand emergencies happen), including routine cleanings or dental work including bridges, crowns, extractions, etc..  For any dental visit - cleaning or dental procedures - patients must take an antibiotic 1 hour before the appointment.  Antibiotics are a lifelong need before dental appointments.  The antibiotic prescribed will be based on each patient's allergies.    WOUND CARE  You will have an ace wrap on your leg put on during surgery. This compression wrap is for comfort and may be removed 24 hours after surgery. You may continue to use compression throughout your recovery if this is comfortable for you.  You have a waterproof bandage on your wound and may shower with this on. The waterproof bandage is to remain in place for 7 days. You may remove it yourself. You may leave your incision open to air after the bandage has been removed.  Under your waterproof bandage you have a mesh and glue dressin in place. Do not peel this off. This will be on for 3-4 weeks. You may trim the edges as they peel off on their own. You can continue to shower with this on. Let the water run freely over your incision when showering and do not scrub.   You may shower upon discharging from the hospital.  Soap and water is permitted to run over the surgical dressing.  Do not scrub directly over these items.  DO  NOT soak your incision in a bath, hot tub, pool or pond/lake for a minimum of 3 weeks following your surgery.  DO NOT use lotions, creams, ointments on your wound for a minimum of 3 weeks following your surgery. At that time you may use vitamin E to assist with softening of your incision.    NUMBNESS & CLICKING  Audible clicking with movement or exercises is considered normal following joint replacement.  You may also feel decreased sensation or numbness near the incision site.  These are usually normal and may or may not fade over time.     RESTARTING HOME ROUTINE - DIET & MEDICATIONS  Post-operative constipation can result due to a combination of inactivity, anesthesia and pain medication. To help prevent this, you should increase your water and fiber intake. Physical activity such as walking will also help stimulate the bowels.   You may resume your normal diet when you discharge home.  Choose foods that help promote good bowel habits and prevent constipation, such as foods high in fiber.  You may restart your home medications the following day after your surgery UNLESS you have been given alternate instructions.  Follow the instructions given to you on your hospital discharge instructions for more information regarding your home medications.    IN-HOME PHYSICAL THERAPY & OUTPATIENT PHYSICAL THERAPY  Continue the exercises you were given in the hospital until you have been seen by in-home therapy.  Make sure to provide a phone number with the ability for the home care staff to leave a message if you do not answer your phone.    Depending on your treatment plan you will begin outpatient or home therapy after surgery. This should be arranged through the office of hospital during discharge  FOR PATIENT DOING HOME THERAPY: Outpatient physical therapy following knee replacement surgery should begin 2-3 weeks after surgery.  You should call to schedule this appointment ASAP if not already scheduled before surgery.   Waiting until you are ready for outpatient physical therapy will cause a delay in your care.  You may choose any outpatient physical therapy location.  Call the office for an order if needed.    EMERGENCIES - WHEN TO CONTACT THE SURGEON'S OFFICE IMMEDIATELY  Fever >101 with chills that has been present for at least 48 hours.   Excessive bleeding from incision that will not slow down. A small amount of drainage is normal and expected.  Once pressure is applied and the area is covered, do not continue to check the area regularly.  This will remove pressure and bleeding will continue.  Leave in place for 4-6 hours.  Signs of infection of incision-excessive drainage that is soaking through your dressing (especially if it is pus-like), redness that is spreading out from the edges of your incision, or increased warmth around the area.  Excruciating pain for which the pain medication, taken as instructed, is not helping.  Severe calf pain.  Go directly to the emergency room or call 911, if you are experiencing chest pain or difficulty breathing.      ICE & COLD THERAPY INSTRUCTIONS    To assist with pain control and post-op swelling, you should be using ice regularly throughout recovery, especially for the first 6 weeks, regardless of the cold therapy method you use.      Always make sure there is a layer of protection between the cold pad and your skin.    If you are using ICE PACKS or GEL PACKS, you will need to alternate 20 minutes on, 20 minutes off twice per hour.    If you are using an ICE MACHINE, please follow the provided ice machine instructions.  These devices differ from ice or ice packs whereas the mechanism circulates water through tubing and a pad to provide longer periods of cold therapy to the desired site.  You can use your cold devices around the clock for optimal comfort.  We recommend using cold therapy after working with therapy or completing exercises on your own.  There is no set schedule in which  you must follow while using cold therapy.  Below are a few points to remember when using a cold therapy device:    You do not need to need to use the 20 on, 20 off method.  Detach the pad from the cooler and ambulate at least once every hour.  You can check your skin under the pad at this time.  You may wear the cold therapy device during periods of sleep including overnight.  If you wake up during the night, you can check the skin at this time.  You do not need to wake up specifically to perform skin checks.  Empty the cooler and pad when device is not in use.  Follow 's instructions for cleaning your cold therapy device.      DISCHARGE MEDICATIONS - Please reference the sample schedule on the reverse side for instructions on how to best schedule medications.    PAIN MEDICATION    _______ Oxycodone   Oxycodone has been prescribed for post-operative pain control. Take one 5 mg tablet every 6 hours for pain. Alternate with Tramadol. See medication example sheet. This medication will only be refilled ONCE every 7 days for a period of 6 weeks. After 6 weeks, you will transition to acetaminophen (Tylenol) and over -the- counter anti-inflammatories such as Ibuprofen, Advil or Aleve in conjunction with ICE.    Side effects may be constipation and nausea, vomiting, sleepiness, dizziness, lightheadedness, headache, blurred vision, dry mouth sweating, itching (if you have itching, over-the -counter Benadryl can be used as needed).   You may NOT operate a motor vehicle while taking this medication or have been cleared by your surgeon or PA.     ________ Tramadol   Tramadol has been prescribed for post-operative pain control. Take one 50 mg tablet every 6 hours for pain. Alternate with Oxycodone. See medication example sheet. This medication will only be refilled ONCE every 7 days for a period of 6 weeks. After 6 weeks, you will transition to acetaminophen (Tylenol) and over- the- counter anti-inflammatories such  as Ibuprofen, Advil or Aleve in conjunction with ICE.    Side effects may be constipation and nausea, vomiting, sleepiness, dizziness, lightheadedness, headache, blurred vision, dry mouth, sweating, itching (if you have itching, over-the -counter Benadryl can be used as needed).   You may NOT operate a motor vehicle while taking this medication or have been cleared by your surgeon or PA.    NON-NARCOTIC PAIN MEDICATION     _________ Celecoxib (Celebrex) or Meloxicam (Mobic) - Prescription anti-inflammatory medication   One of these will be prescribed (if you are able to take it) as an adjunct anti-inflammatory to assist in pain control. Take one twice daily for 4 weeks. See medication example sheet. You will not receive refills on this medication.   Side effects may include nausea or upset stomach    _________ Acetaminophen (Tylenol)   Acetaminophen has been prescribed as an adjunct for pain control. Take two 500 mg tablet every 6-8 hours for 4 weeks. See medication example sheet. No refills will be given after initial prescription.   Side effects may include nausea, heartburn, drowsiness, and headache.    _________Cyclobenzaprine (Flexeril)   This is a muscle relaxer that will be prescribed    Take this AS NEEDED per instructions on bottle   This will be only prescribed once and is available to help with muscle spasms. There will be no refills of this medication    BLOOD THINNER    __________ Aspirin or Other Blood Thinner   Aspirin or another medication has been prescribed as a blood thinner to prevent blood clots in your leg or lungs. Take as prescribed on the bottle for 4 weeks. You will not receive a refill on this medication.   Do not take this medication if you are on another blood thinner.    ANTI NAUSEA    __________ Pantoprazole   Pantoprazole has been prescribed to help with nausea and protect your stomach while taking pain medication. Take one 40 mg tablet once daily for 4 weeks. You will not receive a  refill on this medication.    ANTIBIOTIC     If you are deemed “high risk” for infection after surgery and antibiotic will be prescribed. Please take this as directed for one week.     STOOL SOFTENERS    __________ Senna   Post-operative constipation can result due to a combination of inactivity, anesthesia and pain medication. To help prevent this, you should increase your water and fiber intake. Physical activity such as walking will also help stimulate the bowels.    Senna has been prescribed to help with constipation while on Oxycodone and Tramadol. Take one tablet twice daily for 4 weeks. No refills will be given.   You may also take Miralax in combination with Senna to prevent constipation.  This can be purchased over the counter at your local pharmacy.  Take as instructed on the bottle.   Pain Medication Refills -[phone] or MyChart- Monday through Friday 7am-1pm    Medication refills will be sent upon receipt of your request during the times listed above. Due to the high call volume, you will not receive a call confirming prescription refills; please do not call multiple times.  Prescription refills may take a few hours to process, you may follow up with your pharmacy for pickup availability.    SAMPLE              The times below are an example of how to organize medications to optimize pain control  Your actual medication schedule may vary based on your last dose taken IN THE HOSPITAL      Time 3:00am 6:00am 9:00am 12:00pm 3:00pm 6:00pm 9:00pm 12:00am   Medications Tramadol Acetaminophen (Tylenol)   Oxycodone  Miralax   Blood Thinner  Colace  Pantoprazole  Tramadol  Meloxicam Acetaminophen (Tylenol)   Oxycodone Tramadol Acetaminophen (Tylenol)   Oxycodone  Miralax Blood Thinner  Colace  Tramadol   Acetaminophen (Tylenol)   Oxycodone            You may begin to wean off the pain medication as your pain remains controlled with increased activity.  The schedules provided are meant to serve as an example.   You may wean off based on your pain control.  Please note that pain medications are not filled beyond 6 weeks after surgery.              The times below are an example of how to WEAN OFF medications WHILE CONTINUING TO OPTIMIZE PAIN CONTROL.  Your actual medication schedule may vary based on your last dose taken.  Time 12:00am 4:00am 8:00am 12:00pm 4:00pm 8:00pm   Med Tramadol Oxycodone   Tramadol Oxycodone Tramadol Oxycodone     Time 12:00am 6:00am 12:00pm 6:00pm   Med Tramadol Oxycodone   Tramadol Oxycodone     Time 12:00am 8:00am 4:00pm   Med Tramadol Oxycodone   Tramadol     Time 12:00am 12:00pm   Med Tramadol Tramadol       _________________________________________________________________________________________________________________________________________________________________________    OFFICE INFORMATION    OFFICE LOCATIONS    Location 1: [facility]  [address], [address]  [place] [address]  Office Number: [phone]    Location 2: [facility]  [address]  [facility] [address]  Office Number: [phone]    Location 3: [address] [address]  Office Number: [phone]

## 2025-01-10 ENCOUNTER — PHARMACY VISIT (OUTPATIENT)
Dept: PHARMACY | Facility: CLINIC | Age: 70
End: 2025-01-10
Payer: MEDICARE

## 2025-01-10 ENCOUNTER — HOME HEALTH ADMISSION (OUTPATIENT)
Dept: HOME HEALTH SERVICES | Facility: HOME HEALTH | Age: 70
End: 2025-01-10
Payer: MEDICARE

## 2025-01-10 ENCOUNTER — HOSPITAL ENCOUNTER (OUTPATIENT)
Facility: HOSPITAL | Age: 70
Setting detail: OUTPATIENT SURGERY
Discharge: HOME HEALTH | End: 2025-01-10
Attending: ORTHOPAEDIC SURGERY | Admitting: ORTHOPAEDIC SURGERY
Payer: MEDICARE

## 2025-01-10 ENCOUNTER — ANESTHESIA (OUTPATIENT)
Dept: OPERATING ROOM | Facility: HOSPITAL | Age: 70
End: 2025-01-10
Payer: MEDICARE

## 2025-01-10 ENCOUNTER — DOCUMENTATION (OUTPATIENT)
Dept: HOME HEALTH SERVICES | Facility: HOME HEALTH | Age: 70
End: 2025-01-10

## 2025-01-10 ENCOUNTER — ANESTHESIA EVENT (OUTPATIENT)
Dept: OPERATING ROOM | Facility: HOSPITAL | Age: 70
End: 2025-01-10
Payer: MEDICARE

## 2025-01-10 ENCOUNTER — APPOINTMENT (OUTPATIENT)
Dept: RADIOLOGY | Facility: HOSPITAL | Age: 70
End: 2025-01-10
Payer: MEDICARE

## 2025-01-10 VITALS
SYSTOLIC BLOOD PRESSURE: 146 MMHG | HEART RATE: 70 BPM | HEIGHT: 68 IN | TEMPERATURE: 97.5 F | RESPIRATION RATE: 20 BRPM | OXYGEN SATURATION: 98 % | WEIGHT: 224.3 LBS | DIASTOLIC BLOOD PRESSURE: 84 MMHG | BODY MASS INDEX: 33.99 KG/M2

## 2025-01-10 DIAGNOSIS — M17.12 PRIMARY OSTEOARTHRITIS OF LEFT KNEE: Primary | ICD-10-CM

## 2025-01-10 PROCEDURE — A6213 FOAM DRG >16<=48 SQ IN W/BDR: HCPCS | Performed by: ORTHOPAEDIC SURGERY

## 2025-01-10 PROCEDURE — 2500000004 HC RX 250 GENERAL PHARMACY W/ HCPCS (ALT 636 FOR OP/ED): Performed by: ORTHOPAEDIC SURGERY

## 2025-01-10 PROCEDURE — 7100000001 HC RECOVERY ROOM TIME - INITIAL BASE CHARGE: Performed by: ORTHOPAEDIC SURGERY

## 2025-01-10 PROCEDURE — 2500000004 HC RX 250 GENERAL PHARMACY W/ HCPCS (ALT 636 FOR OP/ED): Performed by: STUDENT IN AN ORGANIZED HEALTH CARE EDUCATION/TRAINING PROGRAM

## 2025-01-10 PROCEDURE — 3700000002 HC GENERAL ANESTHESIA TIME - EACH INCREMENTAL 1 MINUTE: Performed by: ORTHOPAEDIC SURGERY

## 2025-01-10 PROCEDURE — 2720000007 HC OR 272 NO HCPCS: Performed by: ORTHOPAEDIC SURGERY

## 2025-01-10 PROCEDURE — 7100000010 HC PHASE TWO TIME - EACH INCREMENTAL 1 MINUTE: Performed by: ORTHOPAEDIC SURGERY

## 2025-01-10 PROCEDURE — 2500000004 HC RX 250 GENERAL PHARMACY W/ HCPCS (ALT 636 FOR OP/ED): Mod: JZ | Performed by: ORTHOPAEDIC SURGERY

## 2025-01-10 PROCEDURE — 2500000005 HC RX 250 GENERAL PHARMACY W/O HCPCS: Performed by: STUDENT IN AN ORGANIZED HEALTH CARE EDUCATION/TRAINING PROGRAM

## 2025-01-10 PROCEDURE — C1776 JOINT DEVICE (IMPLANTABLE): HCPCS | Performed by: ORTHOPAEDIC SURGERY

## 2025-01-10 PROCEDURE — 2500000005 HC RX 250 GENERAL PHARMACY W/O HCPCS: Performed by: ORTHOPAEDIC SURGERY

## 2025-01-10 PROCEDURE — 73560 X-RAY EXAM OF KNEE 1 OR 2: CPT | Mod: LT

## 2025-01-10 PROCEDURE — RXMED WILLOW AMBULATORY MEDICATION CHARGE

## 2025-01-10 PROCEDURE — 9420000001 HC RT PATIENT EDUCATION 5 MIN

## 2025-01-10 PROCEDURE — A4649 SURGICAL SUPPLIES: HCPCS | Performed by: ORTHOPAEDIC SURGERY

## 2025-01-10 PROCEDURE — 7100000009 HC PHASE TWO TIME - INITIAL BASE CHARGE: Performed by: ORTHOPAEDIC SURGERY

## 2025-01-10 PROCEDURE — 97110 THERAPEUTIC EXERCISES: CPT | Mod: GP

## 2025-01-10 PROCEDURE — 73560 X-RAY EXAM OF KNEE 1 OR 2: CPT | Mod: LEFT SIDE | Performed by: RADIOLOGY

## 2025-01-10 PROCEDURE — 94760 N-INVAS EAR/PLS OXIMETRY 1: CPT

## 2025-01-10 PROCEDURE — 3600000017 HC OR TIME - EACH INCREMENTAL 1 MINUTE - PROCEDURE LEVEL SIX: Performed by: ORTHOPAEDIC SURGERY

## 2025-01-10 PROCEDURE — 3600000018 HC OR TIME - INITIAL BASE CHARGE - PROCEDURE LEVEL SIX: Performed by: ORTHOPAEDIC SURGERY

## 2025-01-10 PROCEDURE — 3700000001 HC GENERAL ANESTHESIA TIME - INITIAL BASE CHARGE: Performed by: ORTHOPAEDIC SURGERY

## 2025-01-10 PROCEDURE — 2500000001 HC RX 250 WO HCPCS SELF ADMINISTERED DRUGS (ALT 637 FOR MEDICARE OP): Performed by: ORTHOPAEDIC SURGERY

## 2025-01-10 PROCEDURE — 7100000002 HC RECOVERY ROOM TIME - EACH INCREMENTAL 1 MINUTE: Performed by: ORTHOPAEDIC SURGERY

## 2025-01-10 PROCEDURE — 27447 TOTAL KNEE ARTHROPLASTY: CPT | Performed by: ORTHOPAEDIC SURGERY

## 2025-01-10 PROCEDURE — 97116 GAIT TRAINING THERAPY: CPT | Mod: GP

## 2025-01-10 PROCEDURE — 97161 PT EVAL LOW COMPLEX 20 MIN: CPT | Mod: GP

## 2025-01-10 PROCEDURE — 2500000004 HC RX 250 GENERAL PHARMACY W/ HCPCS (ALT 636 FOR OP/ED)

## 2025-01-10 PROCEDURE — 2780000003 HC OR 278 NO HCPCS: Performed by: ORTHOPAEDIC SURGERY

## 2025-01-10 DEVICE — PATELLA
Type: IMPLANTABLE DEVICE | Site: KNEE | Status: FUNCTIONAL
Brand: TRIATHLON

## 2025-01-10 DEVICE — BONE PINS (3.2MM X 140MM): Type: IMPLANTABLE DEVICE | Site: KNEE | Status: NON-FUNCTIONAL

## 2025-01-10 DEVICE — TIBIAL COMPONENT
Type: IMPLANTABLE DEVICE | Site: KNEE | Status: FUNCTIONAL
Brand: TRIATHLON

## 2025-01-10 DEVICE — CRUCIATE RETAINING FEMORAL
Type: IMPLANTABLE DEVICE | Site: KNEE | Status: FUNCTIONAL
Brand: TRIATHLON

## 2025-01-10 DEVICE — TIBIAL BEARING INSERT - CS
Type: IMPLANTABLE DEVICE | Site: KNEE | Status: FUNCTIONAL
Brand: TRIATHLON

## 2025-01-10 RX ORDER — PREGABALIN 75 MG/1
75 CAPSULE ORAL ONCE
Status: COMPLETED | OUTPATIENT
Start: 2025-01-10 | End: 2025-01-10

## 2025-01-10 RX ORDER — CELECOXIB 200 MG/1
200 CAPSULE ORAL 2 TIMES DAILY
Qty: 60 CAPSULE | Refills: 0 | Status: SHIPPED | OUTPATIENT
Start: 2025-01-10 | End: 2025-02-09

## 2025-01-10 RX ORDER — NALOXONE HYDROCHLORIDE 0.4 MG/ML
0.2 INJECTION, SOLUTION INTRAMUSCULAR; INTRAVENOUS; SUBCUTANEOUS EVERY 5 MIN PRN
Status: DISCONTINUED | OUTPATIENT
Start: 2025-01-10 | End: 2025-01-10 | Stop reason: HOSPADM

## 2025-01-10 RX ORDER — MIDAZOLAM HYDROCHLORIDE 1 MG/ML
INJECTION, SOLUTION INTRAMUSCULAR; INTRAVENOUS AS NEEDED
Status: DISCONTINUED | OUTPATIENT
Start: 2025-01-10 | End: 2025-01-10

## 2025-01-10 RX ORDER — HYDROMORPHONE HYDROCHLORIDE 0.2 MG/ML
0.2 INJECTION INTRAMUSCULAR; INTRAVENOUS; SUBCUTANEOUS EVERY 5 MIN PRN
Status: DISCONTINUED | OUTPATIENT
Start: 2025-01-10 | End: 2025-01-10 | Stop reason: HOSPADM

## 2025-01-10 RX ORDER — IPRATROPIUM BROMIDE 0.5 MG/2.5ML
500 SOLUTION RESPIRATORY (INHALATION) EVERY 30 MIN PRN
Status: DISCONTINUED | OUTPATIENT
Start: 2025-01-10 | End: 2025-01-10 | Stop reason: HOSPADM

## 2025-01-10 RX ORDER — LABETALOL HYDROCHLORIDE 5 MG/ML
5 INJECTION, SOLUTION INTRAVENOUS EVERY 5 MIN PRN
Status: DISCONTINUED | OUTPATIENT
Start: 2025-01-10 | End: 2025-01-10 | Stop reason: HOSPADM

## 2025-01-10 RX ORDER — CEFAZOLIN SODIUM 2 G/100ML
2 INJECTION, SOLUTION INTRAVENOUS EVERY 6 HOURS
Status: DISCONTINUED | OUTPATIENT
Start: 2025-01-10 | End: 2025-01-10 | Stop reason: HOSPADM

## 2025-01-10 RX ORDER — PANTOPRAZOLE SODIUM 40 MG/1
40 TABLET, DELAYED RELEASE ORAL
Status: DISCONTINUED | OUTPATIENT
Start: 2025-01-11 | End: 2025-01-10 | Stop reason: HOSPADM

## 2025-01-10 RX ORDER — CEFAZOLIN SODIUM 2 G/100ML
2 INJECTION, SOLUTION INTRAVENOUS ONCE
Status: COMPLETED | OUTPATIENT
Start: 2025-01-10 | End: 2025-01-10

## 2025-01-10 RX ORDER — ONDANSETRON HYDROCHLORIDE 2 MG/ML
4 INJECTION, SOLUTION INTRAVENOUS ONCE AS NEEDED
Status: DISCONTINUED | OUTPATIENT
Start: 2025-01-10 | End: 2025-01-10 | Stop reason: HOSPADM

## 2025-01-10 RX ORDER — ACETAMINOPHEN 325 MG/1
975 TABLET ORAL ONCE
Status: COMPLETED | OUTPATIENT
Start: 2025-01-10 | End: 2025-01-10

## 2025-01-10 RX ORDER — MEPERIDINE HYDROCHLORIDE 25 MG/ML
12.5 INJECTION INTRAMUSCULAR; INTRAVENOUS; SUBCUTANEOUS EVERY 10 MIN PRN
Status: DISCONTINUED | OUTPATIENT
Start: 2025-01-10 | End: 2025-01-10 | Stop reason: HOSPADM

## 2025-01-10 RX ORDER — OXYCODONE HYDROCHLORIDE 5 MG/1
5 TABLET ORAL EVERY 6 HOURS PRN
Status: DISCONTINUED | OUTPATIENT
Start: 2025-01-10 | End: 2025-01-10 | Stop reason: HOSPADM

## 2025-01-10 RX ORDER — CYCLOBENZAPRINE HCL 5 MG
5 TABLET ORAL 3 TIMES DAILY PRN
Qty: 30 TABLET | Refills: 0 | Status: SHIPPED | OUTPATIENT
Start: 2025-01-10

## 2025-01-10 RX ORDER — VANCOMYCIN HYDROCHLORIDE 1 G/20ML
1 INJECTION, POWDER, LYOPHILIZED, FOR SOLUTION INTRAVENOUS ONCE
Status: DISCONTINUED | OUTPATIENT
Start: 2025-01-10 | End: 2025-01-10 | Stop reason: HOSPADM

## 2025-01-10 RX ORDER — ONDANSETRON HYDROCHLORIDE 2 MG/ML
INJECTION, SOLUTION INTRAVENOUS AS NEEDED
Status: DISCONTINUED | OUTPATIENT
Start: 2025-01-10 | End: 2025-01-10

## 2025-01-10 RX ORDER — VANCOMYCIN HYDROCHLORIDE 1 G/20ML
INJECTION, POWDER, LYOPHILIZED, FOR SOLUTION INTRAVENOUS AS NEEDED
Status: DISCONTINUED | OUTPATIENT
Start: 2025-01-10 | End: 2025-01-10 | Stop reason: HOSPADM

## 2025-01-10 RX ORDER — PROPOFOL 10 MG/ML
INJECTION, EMULSION INTRAVENOUS AS NEEDED
Status: DISCONTINUED | OUTPATIENT
Start: 2025-01-10 | End: 2025-01-10

## 2025-01-10 RX ORDER — CYCLOBENZAPRINE HCL 10 MG
5 TABLET ORAL 3 TIMES DAILY PRN
Status: DISCONTINUED | OUTPATIENT
Start: 2025-01-10 | End: 2025-01-10 | Stop reason: HOSPADM

## 2025-01-10 RX ORDER — ONDANSETRON 4 MG/1
4 TABLET, ORALLY DISINTEGRATING ORAL EVERY 8 HOURS PRN
Status: DISCONTINUED | OUTPATIENT
Start: 2025-01-10 | End: 2025-01-10 | Stop reason: HOSPADM

## 2025-01-10 RX ORDER — SENNOSIDES 8.6 MG/1
2 TABLET ORAL 2 TIMES DAILY
Status: DISCONTINUED | OUTPATIENT
Start: 2025-01-10 | End: 2025-01-10 | Stop reason: HOSPADM

## 2025-01-10 RX ORDER — PROPOFOL 10 MG/ML
INJECTION, EMULSION INTRAVENOUS CONTINUOUS PRN
Status: DISCONTINUED | OUTPATIENT
Start: 2025-01-10 | End: 2025-01-10

## 2025-01-10 RX ORDER — KETOROLAC TROMETHAMINE 15 MG/ML
15 INJECTION, SOLUTION INTRAMUSCULAR; INTRAVENOUS EVERY 6 HOURS
Status: DISCONTINUED | OUTPATIENT
Start: 2025-01-10 | End: 2025-01-10 | Stop reason: HOSPADM

## 2025-01-10 RX ORDER — FENTANYL CITRATE 50 UG/ML
50 INJECTION, SOLUTION INTRAMUSCULAR; INTRAVENOUS ONCE
Status: COMPLETED | OUTPATIENT
Start: 2025-01-10 | End: 2025-01-10

## 2025-01-10 RX ORDER — CELECOXIB 200 MG/1
400 CAPSULE ORAL ONCE
Status: COMPLETED | OUTPATIENT
Start: 2025-01-10 | End: 2025-01-10

## 2025-01-10 RX ORDER — METOCLOPRAMIDE 10 MG/1
10 TABLET ORAL EVERY 6 HOURS PRN
Status: DISCONTINUED | OUTPATIENT
Start: 2025-01-10 | End: 2025-01-10 | Stop reason: HOSPADM

## 2025-01-10 RX ORDER — OXYCODONE HYDROCHLORIDE 5 MG/1
5 TABLET ORAL EVERY 6 HOURS PRN
Qty: 28 TABLET | Refills: 0 | Status: SHIPPED | OUTPATIENT
Start: 2025-01-10

## 2025-01-10 RX ORDER — OXYCODONE HCL 10 MG/1
10 TABLET, FILM COATED, EXTENDED RELEASE ORAL ONCE
Status: COMPLETED | OUTPATIENT
Start: 2025-01-10 | End: 2025-01-10

## 2025-01-10 RX ORDER — ACETAMINOPHEN 325 MG/1
650 TABLET ORAL EVERY 6 HOURS SCHEDULED
Status: DISCONTINUED | OUTPATIENT
Start: 2025-01-10 | End: 2025-01-10 | Stop reason: HOSPADM

## 2025-01-10 RX ORDER — OXYCODONE HYDROCHLORIDE 5 MG/1
10 TABLET ORAL EVERY 4 HOURS PRN
Status: DISCONTINUED | OUTPATIENT
Start: 2025-01-10 | End: 2025-01-10 | Stop reason: HOSPADM

## 2025-01-10 RX ORDER — NAPROXEN SODIUM 220 MG/1
81 TABLET, FILM COATED ORAL 2 TIMES DAILY
Qty: 60 TABLET | Refills: 0 | Status: SHIPPED | OUTPATIENT
Start: 2025-01-10 | End: 2025-02-09

## 2025-01-10 RX ORDER — TRAMADOL HYDROCHLORIDE 50 MG/1
50 TABLET ORAL EVERY 6 HOURS PRN
Qty: 28 TABLET | Refills: 0 | Status: SHIPPED | OUTPATIENT
Start: 2025-01-10

## 2025-01-10 RX ORDER — ACETAMINOPHEN 325 MG/1
1000 TABLET ORAL EVERY 8 HOURS PRN
Qty: 90 TABLET | Refills: 1 | Status: SHIPPED | OUTPATIENT
Start: 2025-01-10

## 2025-01-10 RX ORDER — ONDANSETRON HYDROCHLORIDE 2 MG/ML
4 INJECTION, SOLUTION INTRAVENOUS EVERY 8 HOURS PRN
Status: DISCONTINUED | OUTPATIENT
Start: 2025-01-10 | End: 2025-01-10 | Stop reason: HOSPADM

## 2025-01-10 RX ORDER — METOCLOPRAMIDE HYDROCHLORIDE 5 MG/ML
10 INJECTION INTRAMUSCULAR; INTRAVENOUS EVERY 6 HOURS PRN
Status: DISCONTINUED | OUTPATIENT
Start: 2025-01-10 | End: 2025-01-10 | Stop reason: HOSPADM

## 2025-01-10 RX ORDER — SODIUM CHLORIDE, SODIUM LACTATE, POTASSIUM CHLORIDE, CALCIUM CHLORIDE 600; 310; 30; 20 MG/100ML; MG/100ML; MG/100ML; MG/100ML
40 INJECTION, SOLUTION INTRAVENOUS CONTINUOUS
Status: DISCONTINUED | OUTPATIENT
Start: 2025-01-10 | End: 2025-01-10 | Stop reason: HOSPADM

## 2025-01-10 RX ORDER — TRANEXAMIC ACID 100 MG/ML
INJECTION, SOLUTION INTRAVENOUS AS NEEDED
Status: DISCONTINUED | OUTPATIENT
Start: 2025-01-10 | End: 2025-01-10

## 2025-01-10 RX ORDER — PANTOPRAZOLE SODIUM 40 MG/1
40 TABLET, DELAYED RELEASE ORAL DAILY PRN
Qty: 30 TABLET | Refills: 0 | Status: SHIPPED | OUTPATIENT
Start: 2025-01-10

## 2025-01-10 RX ORDER — SCOPOLAMINE 1 MG/3D
1 PATCH, EXTENDED RELEASE TRANSDERMAL
Status: DISCONTINUED | OUTPATIENT
Start: 2025-01-10 | End: 2025-01-10 | Stop reason: HOSPADM

## 2025-01-10 RX ORDER — ALBUTEROL SULFATE 0.83 MG/ML
2.5 SOLUTION RESPIRATORY (INHALATION) EVERY 30 MIN PRN
Status: DISCONTINUED | OUTPATIENT
Start: 2025-01-10 | End: 2025-01-10 | Stop reason: HOSPADM

## 2025-01-10 RX ORDER — FENTANYL CITRATE 50 UG/ML
50 INJECTION, SOLUTION INTRAMUSCULAR; INTRAVENOUS EVERY 5 MIN PRN
Status: DISCONTINUED | OUTPATIENT
Start: 2025-01-10 | End: 2025-01-10 | Stop reason: HOSPADM

## 2025-01-10 RX ORDER — BISACODYL 5 MG
10 TABLET, DELAYED RELEASE (ENTERIC COATED) ORAL DAILY PRN
Status: DISCONTINUED | OUTPATIENT
Start: 2025-01-10 | End: 2025-01-10 | Stop reason: HOSPADM

## 2025-01-10 RX ORDER — MIDAZOLAM HYDROCHLORIDE 1 MG/ML
2 INJECTION, SOLUTION INTRAMUSCULAR; INTRAVENOUS ONCE
Status: COMPLETED | OUTPATIENT
Start: 2025-01-10 | End: 2025-01-10

## 2025-01-10 RX ORDER — ROPIVACAINE/EPI/CLONIDINE/KET 2.46-0.005
SYRINGE (ML) INJECTION AS NEEDED
Status: DISCONTINUED | OUTPATIENT
Start: 2025-01-10 | End: 2025-01-10 | Stop reason: HOSPADM

## 2025-01-10 RX ADMIN — TRANEXAMIC ACID 1000 MG: 100 INJECTION INTRAVENOUS at 09:56

## 2025-01-10 RX ADMIN — CELECOXIB 400 MG: 200 CAPSULE ORAL at 07:29

## 2025-01-10 RX ADMIN — SODIUM CHLORIDE, SODIUM LACTATE, POTASSIUM CHLORIDE, AND CALCIUM CHLORIDE 40 ML/HR: 600; 310; 30; 20 INJECTION, SOLUTION INTRAVENOUS at 10:25

## 2025-01-10 RX ADMIN — KETOROLAC TROMETHAMINE 15 MG: 15 INJECTION, SOLUTION INTRAMUSCULAR; INTRAVENOUS at 12:43

## 2025-01-10 RX ADMIN — SCOPOLAMINE 1 PATCH: 1.5 PATCH, EXTENDED RELEASE TRANSDERMAL at 07:31

## 2025-01-10 RX ADMIN — MIDAZOLAM 2 MG: 1 INJECTION INTRAMUSCULAR; INTRAVENOUS at 08:08

## 2025-01-10 RX ADMIN — MEPIVACAINE HYDROCHLORIDE 3.5 ML: 15 INJECTION, SOLUTION EPIDURAL; INFILTRATION at 08:27

## 2025-01-10 RX ADMIN — TRANEXAMIC ACID 1000 MG: 100 INJECTION INTRAVENOUS at 08:31

## 2025-01-10 RX ADMIN — PROPOFOL 750 MG: 10 INJECTION, EMULSION INTRAVENOUS at 08:34

## 2025-01-10 RX ADMIN — PREGABALIN 75 MG: 75 CAPSULE ORAL at 07:29

## 2025-01-10 RX ADMIN — FENTANYL CITRATE 50 MCG: 50 INJECTION INTRAMUSCULAR; INTRAVENOUS at 08:08

## 2025-01-10 RX ADMIN — SODIUM CHLORIDE, POTASSIUM CHLORIDE, SODIUM LACTATE AND CALCIUM CHLORIDE: 600; 310; 30; 20 INJECTION, SOLUTION INTRAVENOUS at 08:03

## 2025-01-10 RX ADMIN — ONDANSETRON 4 MG: 2 INJECTION INTRAMUSCULAR; INTRAVENOUS at 10:03

## 2025-01-10 RX ADMIN — ACETAMINOPHEN 975 MG: 325 TABLET, FILM COATED ORAL at 07:29

## 2025-01-10 RX ADMIN — OXYCODONE HYDROCHLORIDE 10 MG: 10 TABLET, FILM COATED, EXTENDED RELEASE ORAL at 07:51

## 2025-01-10 RX ADMIN — CEFAZOLIN SODIUM 2 G: 2 INJECTION, SOLUTION INTRAVENOUS at 08:34

## 2025-01-10 RX ADMIN — OXYCODONE 5 MG: 5 TABLET ORAL at 12:41

## 2025-01-10 RX ADMIN — MIDAZOLAM 2 MG: 1 INJECTION INTRAMUSCULAR; INTRAVENOUS at 08:21

## 2025-01-10 RX ADMIN — Medication 21 PERCENT: at 11:10

## 2025-01-10 RX ADMIN — POVIDONE-IODINE 1 APPLICATION: 5 SOLUTION TOPICAL at 07:28

## 2025-01-10 SDOH — HEALTH STABILITY: MENTAL HEALTH: CURRENT SMOKER: 0

## 2025-01-10 ASSESSMENT — COGNITIVE AND FUNCTIONAL STATUS - GENERAL
CLIMB 3 TO 5 STEPS WITH RAILING: A LITTLE
MOBILITY SCORE: 23

## 2025-01-10 ASSESSMENT — PAIN - FUNCTIONAL ASSESSMENT
PAIN_FUNCTIONAL_ASSESSMENT: 0-10

## 2025-01-10 ASSESSMENT — PAIN SCALES - GENERAL
PAINLEVEL_OUTOF10: 0 - NO PAIN
PAINLEVEL_OUTOF10: 1
PAINLEVEL_OUTOF10: 0 - NO PAIN
PAINLEVEL_OUTOF10: 0 - NO PAIN
PAINLEVEL_OUTOF10: 1
PAINLEVEL_OUTOF10: 2
PAINLEVEL_OUTOF10: 0 - NO PAIN
PAINLEVEL_OUTOF10: 0 - NO PAIN
PAINLEVEL_OUTOF10: 2
PAINLEVEL_OUTOF10: 0 - NO PAIN

## 2025-01-10 ASSESSMENT — COLUMBIA-SUICIDE SEVERITY RATING SCALE - C-SSRS
2. HAVE YOU ACTUALLY HAD ANY THOUGHTS OF KILLING YOURSELF?: NO
1. IN THE PAST MONTH, HAVE YOU WISHED YOU WERE DEAD OR WISHED YOU COULD GO TO SLEEP AND NOT WAKE UP?: NO

## 2025-01-10 ASSESSMENT — ACTIVITIES OF DAILY LIVING (ADL)
ADLS_ADDRESSED: YES
ADL_ASSISTANCE: INDEPENDENT
LACK_OF_TRANSPORTATION: NO

## 2025-01-10 ASSESSMENT — PAIN DESCRIPTION - DESCRIPTORS: DESCRIPTORS: NUMBNESS

## 2025-01-10 NOTE — ANESTHESIA PROCEDURE NOTES
Peripheral Block    Patient location during procedure: pre-op  Start time: 1/10/2025 8:13 AM  End time: 1/10/2025 8:13 AM  Reason for block: at surgeon's request and post-op pain management  Staffing  Performed: attending   Authorized by: Cheng Mcmillan DO    Performed by: Cheng Mcmillan DO  Preanesthetic Checklist  Completed: patient identified, IV checked, site marked, risks and benefits discussed, surgical consent, monitors and equipment checked, pre-op evaluation and timeout performed   Timeout performed at: 1/10/2025 8:08 AM  Peripheral Block  Patient position: laying flat  Prep: ChloraPrep  Patient monitoring: heart rate, cardiac monitor and continuous pulse ox  Block type: other (IPACK)  Laterality: left  Injection technique: single-shot  Guidance: ultrasound guided  Local infiltration: ropivacaine  Infiltration strength: 0.5 %  Dose: 10 mL  Needle  Needle gauge: 22 G  Needle length: 10 cm  Needle localization: ultrasound guidance     image stored in chart  Assessment  Injection assessment: negative aspiration for heme, no paresthesia on injection and incremental injection  Paresthesia pain: none  Heart rate change: no  Slow fractionated injection: yes

## 2025-01-10 NOTE — PROGRESS NOTES
Met with Patient and Care Partner at bedside- Patient is s/p Left Total Knee Replacement with Dr. Sofia Hamilton.  Discussion with patient included education on the following topics: [facility] Education: Wound Care (Bandage Care & Removal, Personal Hygiene & Infection Prevention), Post-Op Activity (Home PT Regimen, Movement Precautions, Assistive Equipment & 1-2hr Movement), Post-Op Precautions (Falls, Blood Clots & Constipation), Cold-Therapy (Ice vs. Cold Therapy Machines) , Methods for Symptom Management (Pain, Nausea, Swelling & Constipation), Importance of Post-op Prescriptions, How to Obtain Medication Refills, When to Resume Driving & Who to Call, Use of [employer] & Staff Contact Information, When to call 9-1-1, and When to call the Surgeon's Office.  Patient Did Not Complete - Patient had surgery within the last 12 months class prior to surgery.  Patient is able to verbalize understanding of class content/post-operative discussion.  Contact information was provided to patient for support and assistance during the post-operative period.    At the time of this discussion, the patient's plan includes:    Discharge Date/Disposition:  Home, Today with, Home Care Services  Discharge Needs: No Equipment Needs Identified  Medications/Pharmacy: [id number] service [facility]

## 2025-01-10 NOTE — OP NOTE
left TOTAL KNEE ARTHROPLASTY     Date: 1/10/2025   OR Location: [facility] OR    Name: Caroline Oh  : 1955    MRN: [id number]    Diagnosis  Pre-op Diagnosis      * Primary osteoarthritis of left knee [M17.12]  Post-op Diagnosis     * Primary osteoarthritis of left knee [M17.12]      Procedures  Left Total Knee Arthroplasty, Robot-Assisted  37615 - MO ARTHRP KNE CONDYLE&PLATU MEDIAL&LAT COMPARTMENTS      Surgeons      * Sofia Hamilton - Primary     Specimen: none    Staff: Circulator: Coty Wild RN  Scrub Person: AMY Sorenson; Maryanne German     Drains and/or Catheters: none    Estimated Blood Loss: 100 cc    Resident/Fellow/Other Assistant:  Surgeons and Role:  * No surgeons found with a matching role *     Procedure Summary  Anesthesia: Spinal    Anesthesia Staff: Anesthesiologist: Cheng Mcmillan DO   ASA: II     Tourniquet Time: not used    Intra-op Medications:   - 1 Gram Tranexamic acid prior to surgical incision  - 1 Gram Tranexamic acid at start of incision close  - PERLA Pain cockail: ropivacaine-epinephrine-clonidine-ketorolac 2.46-0.005- 0.0008-0.3mg/mL periarticular syringe: 50 cc    IMPLANTS:   Implant Name Type Inv. Item Serial No.  Lot No. LRB No. Used Action   COMPONENT, CR FEMORAL, P4, BEADED W/PA, LEFT - [id number] Joint COMPONENT, CR FEMORAL, P4, BEADED W/PA, LEFT  [employer] [id number] Left 1 Implanted   BASEPLATE, TRIATHLON TRITANIUM, SIZE 4, 46MM - [id number] Joint BASEPLATE, TRIATHLON TRITANIUM, SIZE 4, 46MM  [employer] [facility] [id number] Left 1 Implanted   INSERT, TIBIAL, X3 TRIATHLON CS, SZ-4 9MM - [id number] Joint INSERT, TIBIAL, X3 TRIATHLON CS, SZ-4 9MM  [employer] [id number] Left 1 Implanted   PATELLA, TRIATHLON, ASYMMETRIC, SIZE A29 - [id number] Joint PATELLA, TRIATHLON, ASYMMETRIC, SIZE A29  [employer] [id number] Left 1 Implanted       Findings: See operative note    Operative  Indications:  The patient  is well-known to me and initially presented as an outpatient. They have been treated for advanced knee osteoarthritis with therapy, anti-inflammatories, and activity modification, all without improvement of symptoms. We therefore reviewed the alternative option of elective total knee arthroplasty. The risks and benefits of this procedure were discussed in detail as an outpatient and are documented in our outpatient record. The patient expressed full understanding and wished to proceed. Informed consent was obtained and was placed on the medical chart    The risks, benefits and potential complications of the arthroplasty surgery were discussed with the patient in detail.  Risks including but not limited to the risk of anesthesia, DVT, pulmonary embolism with the possibility of death, retained infection, and neurovascular injury.  Specific details of the procedure, hospitalization, recovery, rehabilitation, and long-term precautions were also provided. Pre-operative teaching was provided. Implant/prosthesis selection was outlined, and the many options available were explained; the final choice will be made at the time of the procedure to match the anatomy and condition of the bone, ligaments, tendons, and muscles. Understanding of all topics was conveyed to me by the patient, and consent was given to proceed with a total knee arthroplasty.     The use of robotic assisted surgery was discussed with the patient.  The risk, benefits were discussed regarding this.  Patient consented for this procedure.  We discussed specifically placement of pins and arrays for navigation during surgery and to help with the robotic assistance.  We discussed the use of an additional bandage to cover the pin sites.  We also reviewed that they may have some slight pain at the placement of pin sites that should improve in the same fashion as their general recovery of the joint replacement.    We discussed the term  robot, when used to describe the HUSSAIN system, refers to the [email or web address] robotic arm. The HUSSAIN System is not a robot, but a surgeon-controlled robotic-arm assisted device.    Procedure In Detail:  The patient was identified in the preoperative area .Their knee was then marked by myself and the patient agreed to proceed with the outlined procedure. Preoperative HUSSAIN plan was reviewed with [facility] . They were transferred to the operating room and positioned supine on the OR table. Appropriate surgical huddle was performed with myself present. Anesthesia was then successfully induced. The operative lower extremity was then prepped and draped in the normal sterile fashion. A critical pause was taken and we identified the patient, the site of surgery, as well as the administration of preoperative IV antibiotics.     With the knee in 90-degrees of flexion an anterior midline incision was made. A subvastus arthrotomy was then made and the knee was extended. A provisional soft tissue medial release was performed to the posterior medial corner. The patellar fat pad was excised and the patella was everted and controlled with two towel clips. The thickness was measured with a caliper and a freehand measured resection was then performed of the articular surface. The patella was then appropriately sized. Peg holes were drilled and a trial was inserted. Restoration of pre-resection thickness was noted. A protective plate was then applied to the patella and it was subluxed into the lateral gutter. The knee was flexed to 90-degrees and retractors were inserted. The ACL and lateral meniscus were released. Tibial and femoral checkpoints were placed for the HUSSAIN system. Tibial and femoral arrays were placed with the utilization of 3.2mm pins in an intra-incisional fashion.     At this point, utilizing the HUSSAIN system the hip center was established. The medial and lateral malleoli were then established and tracker pin in tibia  and femur was confirmed. The femur and tibia were tracked at this point utilizing the sharp blue probe. Secondary check was also performed with accuracy noted to be within under 1 mm. Once confirmed we moved to the planning stage.  Native alignment was captured at this point as well. Any osteophytes that were readily accessible were removed after this and prior to the balancing stage. At this point we turned to the balancing portion of the procedure. A manual stress was placed to capture poses at 10 degrees of extension to assess medial and lateral laxity of collateral ligaments. Following this the knee was brought into flexion and the medial and lateral compartments were stressed independently to assess laxity in flexion.     Once poses were captures we assessed the plan on the EVA system. Appropriate adjustments were made to the femur and tibia to allow restoration of alignments and a knee that was well balanced in flexion and extension. After we were satisfied with the reconstruction we turned to the cutting portion of the cases. Retractors were placed in a self-retaining fashion to protect the MCL and LCL. The EVA cutting arm was then brought into place. We performed the following sequence of cuts: Tibia, anterior femur, anterior chamfer, posterior femur. The blade was then switched to the down cutting blade and the distal femur and posterior chamfer cuts were made. Once this was done, cut bones were removed. Utilizing the  “x” distal femur cut guide we were able to restore limb axis to under one degree of the preoperative plan and our depth of resection to one millimeter of our preoperative plan. Satisfied with this the eva robot was then removed.     Lamina  was inserted into the joint and the remainder of menisci as well as any posterior osteophytes were excised. Pain cocktail was injected in posterior capsule. At this point a tibial baseplate and a trial liner was placed in a “float” fashion. The  knee was then flexed up and the femur was delivered into the wound and a trial femoral component was placed.  With the trials in the knee was then noted to come to full extension and flexed to greater than 120 degrees. The patella tracked centrally throughout the range of motion. Utilizing the [employer] software we used poly trial to allow for no more than 1.5 mm of laxity in the medial and lateral compartments throughout a range of motion. The knee was then brought into extension and the tibial rotation was marked with a bovie on the tibia to allow the rotation to match the shape of the femur.  At this point satisfied with our reconstruction trackers and pins from tibia and femur were removed.    The knee was then flexed back up and the lug holes were drilled for a pressfit femur. The femoral trial was then removed. The tibia was then exposed. Trial tibia was pinned in place in line with our venancio for the tibial tray rotation.  Tibial tray was noted to fit well without any overhang. Femoral and polyethylene trials were inserted and the knee was extended. Satisfied with this reconstruction the tibia was then broached and milled in appropriate fashion. All the trials were removed and the bony surfaces were lavaged with normal saline. Final components were then press-fit in place. They were noted to be fully seated and stable. The knee was then thoroughly lavaged and the final tibial insert was placed. An anesthetic injection cocktail was infiltrated throughout the soft tissues. The arthrotomy was then repaired with #1 barbed suture. Subcutaneous tissues were closed in layers and finally with monocryl. Skin glue was applied.. Sterile dressing was applied and the patient was then transferred to the PACU in stable condition. All counts were correct at the end of the case.     Complications:  None; patient tolerated the procedure well.    Disposition: PACU - hemodynamically stable.  Condition: stable      Plan:                          Weight Bearing Status:  WBAT Bilateral LE                        Range of Motion: As tolerated                        Urbina: none placed                        Dressing: Maintain for one week                        DVT Prophylaxis:  asa 81MG bid x 4 weeks                         Antibiotics: Continue x24 hours elizabeth-operatively                         Discharge/Follow-up: Follow up in clinic in two weeks      Sofia Hamilton DO  Attending Surgeon  Joint Replacement and Adult Reconstructive Surgery  [place], [place]      Attending Attestation: I was present and scrubbed for the entire procedure.    This note was dictated using speech recognition software and was not corrected for spelling or grammatical errors

## 2025-01-10 NOTE — PROGRESS NOTES
01/10/25 1113   Discharge Planning   Living Arrangements Spouse/significant other   Support Systems Spouse/significant other   Assistance Needed Home with 24/7 assist;   Type of Residence Private residence   Number of Stairs to Enter Residence 5   Number of Stairs Within Residence 15   Do you have animals or pets at home? No   Who is requesting discharge planning? Provider   Home or Post Acute Services Other (Comment)  (outpatient therapy - appt scheduled)   Expected Discharge Disposition Home   Does the patient need discharge transport arranged? No   Financial Resource Strain   How hard is it for you to pay for the very basics like food, housing, medical care, and heating? Not hard   Housing Stability   In the last 12 months, was there a time when you were not able to pay the mortgage or rent on time? N   In the past 12 months, how many times have you moved where you were living? 0   At any time in the past 12 months, were you homeless or living in a shelter (including now)? N   Transportation Needs   In the past 12 months, has lack of transportation kept you from medical appointments or from getting medications? no   In the past 12 months, has lack of transportation kept you from meetings, work, or from getting things needed for daily living? No     Pt has OP therapy scheduled on 1/28.  She anticipates HHC for first two weeks. RN TCC confirming with surgeon on DC plan.   Pt has walker/cane and ice machine.

## 2025-01-10 NOTE — PROGRESS NOTES
[facility] ordered.  Jc sent to [facility] to confirm referral and SOC.  Await confirmation.

## 2025-01-10 NOTE — ANESTHESIA POSTPROCEDURE EVALUATION
"Patient: Caroline COY Scurfield \"Iqra\"    Procedure Summary       Date: 01/10/25 Room / Location: [facility] OR 07 / Virtual [facility] OR    Anesthesia Start: 0819 Anesthesia Stop: 1015    Procedure: Left Total Knee Arthroplasty, Robot-Assisted (Left: Knee) Diagnosis:       Primary osteoarthritis of left knee      (Primary osteoarthritis of left knee [M17.12])    Surgeons: Sofia Hamilton DO Responsible Provider: Cheng Mcmillan DO    Anesthesia Type: regional, spinal ASA Status: 2            Anesthesia Type: regional, spinal    Vitals Value Taken Time   /70 01/10/25 1014   Temp 36 01/10/25 1015   Pulse 72 01/10/25 1014   Resp 15 01/10/25 1014   SpO2 99 % 01/10/25 1014   Vitals shown include unfiled device data.    Anesthesia Post Evaluation    Patient location during evaluation: bedside  Patient participation: complete - patient participated  Level of consciousness: awake and alert  Pain management: adequate  Multimodal analgesia pain management approach  Airway patency: patent  Two or more strategies used to mitigate risk of obstructive sleep apnea  Cardiovascular status: acceptable  Respiratory status: acceptable  Hydration status: acceptable  Postoperative Nausea and Vomiting: none        No notable events documented.    "

## 2025-01-10 NOTE — ANESTHESIA PREPROCEDURE EVALUATION
"Patient: Caroline COY Scurfield \"Iqra\"    Procedure Information       Date/Time: 01/10/25 0915    Procedure: Left Total Knee Arthroplasty, Robot-Assisted ([place], HUSSAIN) *Rapid Recovery* (Left: Knee)    Location: [facility] OR 07 / [facility] OR    Surgeons: Sofia Hamilton DO          Past Medical History:   Diagnosis Date    Anxiety     Arthritis May 2019    Hypertension     Hypothyroidism     Irritable bowel syndrome 10 years    Obesity ?    Osteoarthritis of knee     Tear of meniscus of knee May 2019    Unilateral primary osteoarthritis, unspecified knee     Arthritis of knee       Relevant Problems   Anesthesia (within normal limits)      Cardiac   (+) Benign essential hypertension   (+) Hyperlipidemia   (+) Hypertriglyceridemia      Neuro   (+) Anxiety      Endocrine   (+) Acquired hypothyroidism      Musculoskeletal   (+) Primary osteoarthritis of left knee       Clinical information reviewed:   Tobacco  Allergies  Meds   Med Hx  Surg Hx  OB Status  Fam Hx  Soc   Hx        NPO Detail:  NPO/Void Status  Date of Last Liquid: 01/10/25  Time of Last Liquid: 0455  Date of Last Solid: 01/09/25  Time of Last Solid: 2200  Last Intake Type: Clear fluids  Time of Last Void: 0706         Physical Exam    Airway  Mallampati: II  TM distance: >3 FB  Neck ROM: full     Cardiovascular   Comments: deferred   Dental   Comments: No loose teeth   Pulmonary   Comments: deferred   Abdominal     Comments: deferred           Anesthesia Plan    History of general anesthesia?: yes  History of complications of general anesthesia?: no    ASA 2     regional and spinal     The patient is not a current smoker.  Patient was not previously instructed to abstain from smoking on day of procedure.  Patient did not smoke on day of procedure.  Education provided regarding risk of obstructive sleep apnea.  intravenous induction   Postoperative administration of opioids is intended.  Anesthetic plan and risks discussed with patient.  Use of blood " products discussed with patient who consented to blood products.    Plan discussed with CRNA and CAA.

## 2025-01-10 NOTE — PROGRESS NOTES
Physical Therapy Evaluation & Treatment    Patient Name: Iqra Oh  MRN: [id number]  Department:   Room: [facility] Date: 1/10/2025   Time Calculation  Start Time: 1219  Stop Time: 1305  Time Calculation (min): 46 min    Assessment/Plan   PT Assessment  PT Assessment Results: Decreased strength, Decreased range of motion, Decreased endurance, Impaired balance, Orthopedic restrictions, Pain  Rehab Prognosis: Excellent  Barriers to Discharge Home: No anticipated barriers  Evaluation/Treatment Tolerance: Patient tolerated treatment well  End of Session Communication: Bedside nurse  Assessment Comment: Pt presents with impaired functional mobility s/p L TKR. Recommend discharge home with intermittent assistance and home health PT. Pt was issued HEP handout. Pt verbalized and demonstrated understanding.   End of Session Patient Position: Up in chair, BLE elevated, ice to surgical site, call light in reach, needs met, RN aware.  IP OR SWING BED PT PLAN  Inpatient or Swing Bed: Inpatient  PT Plan  Treatment/Interventions: Bed mobility, Transfer training, Gait training, Stair training, Balance training, Strengthening, Endurance training, Range of motion, Therapeutic exercise, Home exercise program, Therapeutic activity, Positioning, Postural re-education  PT Plan: Ongoing PT  PT Frequency: BID  PT Discharge Recommendations: Low intensity level of continued care  Equipment Recommended upon Discharge: Wheeled walker, Straight cane  PT Recommended Transfer Status: Stand by assist, Assistive device  PT - OK to Discharge: Yes      Subjective     General Visit Information:  General  Reason for Referral: L TKR  Referred By: Dr. Hamilton  Past Medical History Relevant to Rehab: OA, R TKR, anxiety, HTN, IBS, hypothyroidism, hysterectomy  Family/Caregiver Present: Yes (spouse)  Prior to Session Communication: Bedside nurse  Patient Position Received: Bed, 2 rail up  General Comment: L knee post-op dressing dry and  intact.     Home Living:  Home Living  Type of Home: House  Lives With: Spouse  Home Adaptive Equipment: Walker rolling or standard, Cane  Home Layout: Multi-level, Stairs to alternate level with rails  Alternate Level Stairs-Rails: Right  Alternate Level Stairs-Number of Steps: 12  Home Access: Stairs to enter with rails  Entrance Stairs-Rails: Right  Entrance Stairs-Number of Steps: 5  Prior Level of Function:  Prior Function Per Pt/Caregiver Report  Level of [place]: Independent with ADLs and functional transfers, Independent with homemaking with ambulation  ADL Assistance: Independent  Homemaking Assistance: Independent  Ambulatory Assistance: Independent  Vocational: Retired  Prior Function Comments: Pt denies falls prior to admission.  Precautions:  Precautions  LE Weight Bearing Status: Weight Bearing as Tolerated  Medical Precautions: Fall precautions  Post-Surgical Precautions: Left total knee precautions      Objective   Pain:  Pain Assessment  Pain Assessment: 0-10  0-10 (Numeric) Pain Score: 0 - No pain (pt reports 0/10; at rest, but 2-3/10 after ther ex - RN admin pain meds)  Pain Type: Surgical pain  Pain Location: Knee  Pain Orientation: Left  Pain Interventions: Medication (See MAR)  Cognition:  Cognition  Overall Cognitive Status: Within Functional Limits  Attention: Within Functional Limits  Memory: Within Funtional Limits  Problem Solving: Within Functional Limits  Numeric Reasoning: Within Functional Limits  Abstract Reasoning: Within Functional Limits  Safety/Judgement: Within Functional Limits    General Assessments:  Activity Tolerance  Endurance: Endurance does not limit participation in activity    Sensation  Light Touch: No apparent deficits    Coordination  Movements are Fluid and Coordinated: Yes    Postural Control  Postural Control: Within Functional Limits    Static Sitting Balance  Static Sitting-Balance Support: Feet supported  Static Sitting-Level of Assistance:  Independent  Dynamic Sitting Balance  Dynamic Sitting-Balance Support: Feet supported  Dynamic Sitting-Level of Assistance: Independent    Static Standing Balance  Static Standing-Balance Support: Bilateral upper extremity supported (with RW)  Static Standing-Level of Assistance: Close supervision  Dynamic Standing Balance  Dynamic Standing-Balance Support: Bilateral upper extremity supported (with RW)  Dynamic Standing-Level of Assistance: Close supervision  Functional Assessments:  ADL  ADL's Addressed: Yes  UE Dressing Assistance: Independent  LE Dressing Assistance: Stand by  LE Dressing Deficit: Verbal cueing, Supervision/safety  Toileting Assistance with Device: Stand by  Toileting Deficit: Verbal cueing, Supervison/safety  Functional Assistance: Stand by  Functional Deficit: Toilet transfer    Bed Mobility  Bed Mobility: Yes  Bed Mobility 1  Bed Mobility 1: Supine to sitting  Level of Assistance 1: Close supervision    Transfers  Transfer: Yes  Transfer 1  Transfer From 1: Bed to, Toilet to, Wheelchair to, Chair with arms to  Transfer to 1: Wheelchair, Toilet, Chair with arms  Technique 1: Sit to stand, Stand to sit  Transfer Device 1: Walker  Transfer Level of Assistance 1: Close supervision, Minimal verbal cues (cues for hand placement)    Ambulation/Gait Training  Ambulation/Gait Training Performed: Yes  Ambulation/Gait Training 1  Surface 1: Level tile  Device 1: Rolling walker  Assistance 1: Close supervision, Minimal verbal cues (cues for sequencing)  Quality of Gait 1: Decreased step length, Antalgic (decreased elaina, step to progressing to reciprocal pattern, no LOB noted.)  Comments/Distance (ft) 1: 150 feet x 1    Stairs  Stairs: Yes  Stairs  Rails 1: Right (BUE support on one rail to simulate home)  Device 1: Railing  Assistance 1: Close supervision, Minimal verbal cues (cues for sequencing)  Comment/Number of Steps 1: 3  Pt demonstrated step to pattern, decreased elaina, no LOB noted.      Extremity/Trunk Assessments:  RUE   RUE : Within Functional Limits  LUE   LUE: Within Functional Limits  RLE   RLE : Within Functional Limits  LLE   LLE : Exceptions to WFL, knee ROM/strength limited due to post-op pain and surgeon restrictions.     Treatments:  Therapeutic Exercise  Therapeutic Exercise Performed: Yes  B ankle pumps, L quad sets, L gluteal sets, L heel slides, L SAQ, L hip abduction, and L SLR x 10 reps each.    Outcome Measures:  [facility] Basic Mobility  Turning from your back to your side while in a flat bed without using bedrails: None  Moving from lying on your back to sitting on the side of a flat bed without using bedrails: None  Moving to and from bed to chair (including a wheelchair): None  Standing up from a chair using your arms (e.g. wheelchair or bedside chair): None  To walk in hospital room: None  Climbing 3-5 steps with railing: A little  Basic Mobility - Total Score: 23        Education Documentation  Handouts, taught by Radha Watson PT at 1/10/2025 12:19 PM.  Learner: Significant Other, Patient  Readiness: Acceptance  Method: Explanation, Demonstration, Handout  Response: Verbalizes Understanding, Demonstrated Understanding    Precautions, taught by Radha Watson PT at 1/10/2025 12:19 PM.  Learner: Significant Other, Patient  Readiness: Acceptance  Method: Explanation, Demonstration, Handout  Response: Verbalizes Understanding, Demonstrated Understanding    Body Mechanics, taught by Radha Watson PT at 1/10/2025 12:19 PM.  Learner: Significant Other, Patient  Readiness: Acceptance  Method: Explanation, Demonstration, Handout  Response: Verbalizes Understanding, Demonstrated Understanding    Home Exercise Program, taught by Radha Watson PT at 1/10/2025 12:19 PM.  Learner: Significant Other, Patient  Readiness: Acceptance  Method: Explanation, Demonstration, Handout  Response: Verbalizes Understanding, Demonstrated Understanding    Mobility Training,  taught by Radha Watson, PT at 1/10/2025 12:19 PM.  Learner: Significant Other, Patient  Readiness: Acceptance  Method: Explanation, Demonstration, Handout  Response: Verbalizes Understanding, Demonstrated Understanding    Education Comments  No comments found.    Radha Watson, PT, DPT

## 2025-01-10 NOTE — ANESTHESIA PROCEDURE NOTES
Peripheral Block    Patient location during procedure: pre-op  Start time: 1/10/2025 8:10 AM  End time: 1/10/2025 8:12 AM  Reason for block: at surgeon's request and post-op pain management  Staffing  Performed: attending   Authorized by: Cheng Mcmillan DO    Performed by: Cheng Mcmillan DO  Preanesthetic Checklist  Completed: patient identified, IV checked, site marked, risks and benefits discussed, surgical consent, monitors and equipment checked, pre-op evaluation and timeout performed   Timeout performed at: 1/10/2025 8:08 AM  Peripheral Block  Patient position: laying flat  Prep: ChloraPrep  Patient monitoring: heart rate, cardiac monitor and continuous pulse ox  Block type: adductor canal  Laterality: left  Injection technique: single-shot  Guidance: ultrasound guided  Local infiltration: ropivacaine  Infiltration strength: 0.5 %  Dose: 30 mL  Needle  Needle gauge: 22 G  Needle length: 10 cm  Needle localization: ultrasound guidance     image stored in chart  Assessment  Injection assessment: negative aspiration for heme, no paresthesia on injection and incremental injection  Paresthesia pain: none  Heart rate change: no  Slow fractionated injection: yes  Additional Notes  With 4mg decadron

## 2025-01-10 NOTE — NURSING NOTE
Patient to room 24 for RR. Team notified of patient arrival.      Bedside handoff report done in PACU. Patient transported via cart to room.      Plan of day discussed with patient and family; all questions answered at this time. Patient and family verbalized understanding that patient is not to ambulate without RN at bedside. Bed locked and lowered, call light in reach. Patient safety maintained.     Orders reviewed and released.

## 2025-01-10 NOTE — HH CARE COORDINATION
Home Care received a Referral for Physical Therapy and Occupational Therapy. We have processed the referral for a Start of Care on 24 HOURS .     If you have any questions or concerns, please feel free to contact us at [phone]. Follow the prompts, enter your five digit zip code, and you will be directed to your care team on [facility] 2.

## 2025-01-10 NOTE — PERIOPERATIVE NURSING NOTE
1013 Arrives to pacu 2 with Oral airway unresponsive. Lungs clear chest rises and fall with fogging of mask with respirations. Exchanging well. Sinus rhythm Radiology called for X ray. Dressing to left knee dry Pulses palpable brisk capillary refill.   1016 Opens eyes and lefts head off pillow on command, Oral airway removed.  1020 X ray taken at bedside. Patient asking multiple questions. Answered as able. Denies pain and nausea.  Spinal L1 Unable to move legs. Pulses palpable. NO cherie or nausea. Taking water po well IV patent.  1030 Spinal L3 able to roll bilateral legs. Dressing dry and intact no pain or nausea.   1040 Spinal L3/4 able tor roll legs.  1050 Able to bend knees no pain or nausea SDS updated on patient progress.  1059 TO SDS

## 2025-01-11 ENCOUNTER — HOME CARE VISIT (OUTPATIENT)
Dept: HOME HEALTH SERVICES | Facility: HOME HEALTH | Age: 70
End: 2025-01-11
Payer: MEDICARE

## 2025-01-11 VITALS
RESPIRATION RATE: 16 BRPM | HEART RATE: 75 BPM | TEMPERATURE: 98.5 F | OXYGEN SATURATION: 98 % | SYSTOLIC BLOOD PRESSURE: 132 MMHG | DIASTOLIC BLOOD PRESSURE: 60 MMHG

## 2025-01-11 PROCEDURE — G0151 HHCP-SERV OF PT,EA 15 MIN: HCPCS | Mod: HHH

## 2025-01-11 PROCEDURE — 169592 NO-PAY CLAIM PROCEDURE

## 2025-01-11 SDOH — HEALTH STABILITY: PHYSICAL HEALTH: EXERCISE COMMENTS: SUPINE QUAD SETS, ANKLE PUMPS, HEEL SLIDES, SLR 10 REPS  SITTING LAQ, KNEE FLEXION

## 2025-01-11 ASSESSMENT — BALANCE ASSESSMENTS
IMMEDIATE STANDING BALANCE FIRST 5 SECONDS: 1 - STEADY BUT USES WALKER OR OTHER SUPPORT
STANDING BALANCE: 1 - STEADY BUT WIDE STANCE AND USES CANE OR OTHER SUPPORT
ATTEMPTS TO ARISE: 2 - ABLE TO RISE, ONE ATTEMPT
EYES CLOSED AT MAXIMUM POSITION NUDGED: 1 - STEADY
BALANCE SCORE: 13
NUDGED SCORE: 2
SITTING BALANCE: 1 - STEADY, SAFE
ARISING SCORE: 1
TURNING 360 DEGREES STEPS: 1 - CONTINUOUS STEPS
SITTING DOWN: 2 - SAFE, SMOOTH MOTION
ARISES: 1 - ABLE, USES ARMS TO HELP
NUDGED: 2 - STEADY

## 2025-01-11 ASSESSMENT — GAIT ASSESSMENTS
WALKING STANCE: 0 - HEELS APART
TRUNK SCORE: 1
STEP CONTINUITY: 1 - STEPS APPEAR CONTINUOUS
PATH SCORE: 1
PATH: 1 - MILD/MODERATE DEVIATION OR USES WALKING AID
GAIT SCORE: 8
STEP SYMMETRY: 1 - RIGHT AND LEFT STEP LENGTH APPEAR EQUAL
TRUNK: 1 - NO SWAY BUT FLEXION OF KNEES OR BACK OR SPREADS ARMS WHILE WALKING
INITIATION OF GAIT IMMEDIATELY AFTER GO: 1 - NO HESITANCY
BALANCE AND GAIT SCORE: 21

## 2025-01-11 ASSESSMENT — ENCOUNTER SYMPTOMS
LOWEST PAIN SEVERITY IN PAST 24 HOURS: 3/10
PAIN SEVERITY GOAL: 1/10
MUSCLE WEAKNESS: 1
HIGHEST PAIN SEVERITY IN PAST 24 HOURS: 8/10
PAIN LOCATION: LEFT KNEE
OCCASIONAL FEELINGS OF UNSTEADINESS: 0
LIMITED RANGE OF MOTION: 1
PERSON REPORTING PAIN: PATIENT
PAIN: 1

## 2025-01-11 ASSESSMENT — ACTIVITIES OF DAILY LIVING (ADL)
AMBULATION ASSISTANCE: 1
ENTERING_EXITING_HOME: STAND BY ASSIST
AMBULATION ASSISTANCE ON FLAT SURFACES: 1
AMBULATION_DISTANCE/DURATION_TOLERATED: 40
CURRENT_FUNCTION: STAND BY ASSIST
OASIS_M1830: 05
AMBULATION ASSISTANCE: STAND BY ASSIST
PHYSICAL TRANSFERS ASSESSED: 1

## 2025-01-13 ENCOUNTER — HOME CARE VISIT (OUTPATIENT)
Dept: HOME HEALTH SERVICES | Facility: HOME HEALTH | Age: 70
End: 2025-01-13
Payer: MEDICARE

## 2025-01-13 NOTE — SIGNIFICANT EVENT
Thank you for taking my call today regarding your recent joint replacement surgery with Dr. Sofia Hamilton.      We discussed that: Home Health Care services (physical and/or occupational therapy) have been initiated Your pain is Controlled on the current regimen Will fluctuate throughout recovery with increased activity You are able to tolerate regular activity and exercises The importance of continued cold therapy throughout recovery The importance of following the prescribed precautions by your surgeon The importance of continuing blood thinner as prescribed The importance of wearing compression stockings as prescribed    You indicated that all of your questions have been answered at the time of our call.    Please don't hesitate to reach out if you have any additional questions or concerns.    Cata Rain MBA, BSN, RN-BC  Orthopedic Program Navigator  [facility]   [phone]

## 2025-01-14 ENCOUNTER — APPOINTMENT (OUTPATIENT)
Dept: HOME HEALTH SERVICES | Facility: HOME HEALTH | Age: 70
End: 2025-01-14
Payer: MEDICARE

## 2025-01-14 PROCEDURE — G0151 HHCP-SERV OF PT,EA 15 MIN: HCPCS | Mod: HHH

## 2025-01-14 SDOH — ECONOMIC STABILITY: HOUSING INSECURITY: HOME SAFETY: TKA PRECAUTIONS

## 2025-01-14 SDOH — HEALTH STABILITY: PHYSICAL HEALTH: EXERCISE TYPE: ADVANCED HEP PER TKA PROTOCOL

## 2025-01-14 ASSESSMENT — ENCOUNTER SYMPTOMS
PAIN: 1
PERSON REPORTING PAIN: PATIENT
LIMITED RANGE OF MOTION: 1
MUSCLE WEAKNESS: 1

## 2025-01-15 ENCOUNTER — TELEPHONE (OUTPATIENT)
Dept: ORTHOPEDIC SURGERY | Facility: HOSPITAL | Age: 70
End: 2025-01-15
Payer: MEDICARE

## 2025-01-15 NOTE — TELEPHONE ENCOUNTER
Patient calling asking for a refill on medication. Status post left tka done 01/10/2025.   Oxycodone & Tramadol - states that she has enough to last until Friday but is unsure the amount because she is sitting down at the moment.     Pharmacy - [facility]

## 2025-01-16 DIAGNOSIS — M17.12 PRIMARY OSTEOARTHRITIS OF LEFT KNEE: ICD-10-CM

## 2025-01-16 RX ORDER — OXYCODONE HYDROCHLORIDE 5 MG/1
5 TABLET ORAL EVERY 6 HOURS PRN
Qty: 28 TABLET | Refills: 0 | Status: SHIPPED | OUTPATIENT
Start: 2025-01-16

## 2025-01-16 RX ORDER — TRAMADOL HYDROCHLORIDE 50 MG/1
50 TABLET ORAL EVERY 6 HOURS PRN
Qty: 28 TABLET | Refills: 0 | Status: SHIPPED | OUTPATIENT
Start: 2025-01-16

## 2025-01-17 ENCOUNTER — HOME CARE VISIT (OUTPATIENT)
Dept: HOME HEALTH SERVICES | Facility: HOME HEALTH | Age: 70
End: 2025-01-17
Payer: MEDICARE

## 2025-01-17 PROCEDURE — G0151 HHCP-SERV OF PT,EA 15 MIN: HCPCS | Mod: HHH

## 2025-01-17 ASSESSMENT — ENCOUNTER SYMPTOMS
PAIN: 1
PERSON REPORTING PAIN: PATIENT

## 2025-01-21 ENCOUNTER — HOME CARE VISIT (OUTPATIENT)
Dept: HOME HEALTH SERVICES | Facility: HOME HEALTH | Age: 70
End: 2025-01-21
Payer: MEDICARE

## 2025-01-21 PROCEDURE — G0151 HHCP-SERV OF PT,EA 15 MIN: HCPCS | Mod: HHH

## 2025-01-22 ASSESSMENT — ENCOUNTER SYMPTOMS
PERSON REPORTING PAIN: PATIENT
PAIN: 1

## 2025-01-24 ENCOUNTER — HOME CARE VISIT (OUTPATIENT)
Dept: HOME HEALTH SERVICES | Facility: HOME HEALTH | Age: 70
End: 2025-01-24
Payer: MEDICARE

## 2025-01-24 PROCEDURE — G0151 HHCP-SERV OF PT,EA 15 MIN: HCPCS | Mod: HHH

## 2025-01-24 ASSESSMENT — ACTIVITIES OF DAILY LIVING (ADL)
HOME_HEALTH_OASIS: 00
OASIS_M1830: 00

## 2025-01-24 ASSESSMENT — ENCOUNTER SYMPTOMS
PAIN LOCATION: LEFT KNEE
PAIN: 1
MUSCLE WEAKNESS: 1
OCCASIONAL FEELINGS OF UNSTEADINESS: 0
PERSON REPORTING PAIN: PATIENT

## 2025-01-28 ENCOUNTER — APPOINTMENT (OUTPATIENT)
Dept: ORTHOPEDIC SURGERY | Facility: CLINIC | Age: 70
End: 2025-01-28
Payer: MEDICARE

## 2025-01-28 ENCOUNTER — EVALUATION (OUTPATIENT)
Dept: PHYSICAL THERAPY | Facility: CLINIC | Age: 70
End: 2025-01-28
Payer: MEDICARE

## 2025-01-28 VITALS — BODY MASS INDEX: 33.95 KG/M2 | WEIGHT: 224 LBS | HEIGHT: 68 IN

## 2025-01-28 DIAGNOSIS — M17.11 PRIMARY OSTEOARTHRITIS OF RIGHT KNEE: ICD-10-CM

## 2025-01-28 DIAGNOSIS — Z96.652 S/P TOTAL KNEE ARTHROPLASTY, LEFT: Primary | ICD-10-CM

## 2025-01-28 DIAGNOSIS — M17.12 PRIMARY OSTEOARTHRITIS OF LEFT KNEE: ICD-10-CM

## 2025-01-28 PROCEDURE — 99024 POSTOP FOLLOW-UP VISIT: CPT | Performed by: ORTHOPAEDIC SURGERY

## 2025-01-28 PROCEDURE — 97161 PT EVAL LOW COMPLEX 20 MIN: CPT | Mod: GP | Performed by: PHYSICAL THERAPIST

## 2025-01-28 PROCEDURE — 1123F ACP DISCUSS/DSCN MKR DOCD: CPT | Performed by: ORTHOPAEDIC SURGERY

## 2025-01-28 PROCEDURE — 3008F BODY MASS INDEX DOCD: CPT | Performed by: ORTHOPAEDIC SURGERY

## 2025-01-28 PROCEDURE — 97110 THERAPEUTIC EXERCISES: CPT | Mod: GP | Performed by: PHYSICAL THERAPIST

## 2025-01-28 PROCEDURE — 1159F MED LIST DOCD IN RCRD: CPT | Performed by: ORTHOPAEDIC SURGERY

## 2025-01-28 RX ORDER — TRAMADOL HYDROCHLORIDE 50 MG/1
50 TABLET ORAL EVERY 6 HOURS PRN
Qty: 28 TABLET | Refills: 0 | Status: SHIPPED | OUTPATIENT
Start: 2025-01-28

## 2025-01-28 RX ORDER — OXYCODONE HYDROCHLORIDE 5 MG/1
5 TABLET ORAL EVERY 6 HOURS PRN
Qty: 28 TABLET | Refills: 0 | Status: SHIPPED | OUTPATIENT
Start: 2025-01-28

## 2025-01-28 ASSESSMENT — ENCOUNTER SYMPTOMS
DEPRESSION: 0
OCCASIONAL FEELINGS OF UNSTEADINESS: 0
LOSS OF SENSATION IN FEET: 0

## 2025-01-28 NOTE — PROGRESS NOTES
Physical Therapy Evaluation    Patient Name: Caroline Oh  MRN: [id number]  Today's Date: 1/28/2025  Visit: 1/per auth  DOS/DOI:  1/10/25  End date: per cierra  Referred by: Sofia Hamilton  Time Calculation  Start Time: 1052  Stop Time: 1132  Time Calculation (min): 40 min  PT Evaluation Time Entry  PT Evaluation (Low) Time Entry: 25  PT Therapeutic Procedures Time Entry  Manual Therapy Time Entry: 5  Therapeutic Exercise Time Entry: 10                 Diagnosis:   1. S/P total knee arthroplasty, left        2. Primary osteoarthritis of left knee  Referral to Physical Therapy                PMH Thyroid disease, HBP, OA    HPI  Pt had Rt meniscus surgery in 2019.  At that time she  found out she had arthritis in B knees.  She had a R TKA in 2023.   over time her pain in the Lt knee got progressively worse.  Had 3 injections in Lt knee and these did not give prolonged relief to her pain so pt decided to have Lt TKA.    Precautions avoid twisting, pivoting    SUBJECTIVE  Symptoms:  Pain: 0/10, Pt reports 10/10 pain at worst, after the nerve block wore off.  Feels pain around both sides of the knee and sometimes under the kneecap.  Reports the pain as achy and stiff.  Sometimes has a burning sensation.      Aggravating factors: Home exercises cause discomfort.      Alleviating factors: Ice, rest.    Functional limitations:  Pt has some challenge getting up from the toilet, uses the door knob to push herself up.  Reports no difficulty with stairs.  Unable to walk with normal gait pattern.     Patient's Living Environment: 2 story, steps to get into the house and upstairs, no pain now with stairs.      Previous Level of Function:   - decreased ability to do ADLS prior to surgery due to OA    Patient's Therapy Goals: Wants knee to work correctly, and be able to walk again.  Doesn't want to limp anymore.        OBJECTIVE  Observation:    - She amb with ww with reduced WB on L LE circumducting Rt hip.   -  Incision healing  well and covered by steri strips  - moderate swelling present.    AROM:  Knee ext= 0  Knee flex=107    MMT:  -Lt hip/knee  Hip flex-4-/5  Hip abd- 4+/5  Hip ext-3+/5  QS-good    Outcome Measure:  LEFS= 22    ASSESSMENT  The patient is a 69 year old female presenting to the clinic after Lt TKA surgery on 1/10/25.  She reports to the clinic ambulating with a wheeled walker.  She has swelling as well as decreased strength, ROM and WB as expected after TKA surgery.  With these limitations, pt has difficulty with ADLs, walking, and getting up from the toilet seat.  Pt would benefit from skilled therapy to improve ROM and strength needed to get up from a low toilet seat safely and walk with a normal gait pattern.        Is skilled care required: yes  Rehab potential: good  Clinical presentation:  Stable and/or uncomplicated characteristics  Complexity:   . Low complexity due to patient's clinical presentation being stable and uncomplicated by any significant comorbidities that may affect rehab tolerance and progression.       Personal factors effecting care: none    PLAN  Frequency/duration: 2x/ week for 8 weeks  Planned Interventions:  MT, therapeutic exercise, neuro re-ed, modalities prn  Patient/caregiver agrees with POC:  yes    TODAY'S TREATMENT  -Evaluation of Lt knee completed, educated on condition and discussed course of therapy   -Knee flexion mobilization performed 1x10 (improved to 109 degrees of Lt knee flexion at end of session)  -HEP provided as listed below, discussed with pt    Access Code: [email or web address]  URL: [email or web address]  Date: 01/28/2025  Prepared by: Hilario Rodriguez    Exercises  - Supine Heel Slide  - 1 x daily - 7 x weekly - 3 sets - 10 reps  - Sidelying Hip Abduction  - 1 x daily - 7 x weekly - 3 sets - 10 reps  - Seated Long Arc Quad  - 1 x daily - 7 x weekly - 3 sets - 10 reps  - Supine Bridge  - 1 x daily - 7 x weekly - 3 sets - 10 reps  - Supine Active Straight Leg Raise   - 1 x daily - 7 x weekly - 3 sets - 10 reps      Goals:   Active       PT Problem       Pt will be independent with HEP       Start:  01/28/25    Expected End:  02/04/25            Pt able to improve Knee Flexion ROM to 120 degrees needed for safety with ADLs       Start:  01/28/25    Expected End:  03/25/25            Improve MMT of Lt knee to 5/5 to improve ability to get up from low toilet seat safely       Start:  01/28/25    Expected End:  03/25/25            Pt able to ambulate without use of assistive device       Start:  01/28/25    Expected End:  03/25/25            Improve LEFS score to 50       Start:  01/28/25    Expected End:  03/25/25

## 2025-01-28 NOTE — PROGRESS NOTES
ORTHOPEDIC TOTAL JOINT  POST-OPERATIVE FOLLOW UP      ============================  IMPRESSION/PLAN:  ============================  69 y.o. female s/p Left Total Knee Replacement completed on 01/10/2025 and Right total knee replacement done 9/29/2023.    PLAN:  -Weightbearing: Weight bearing as tolerated  -DVT Prophylaxis: Aspirin 81 mg twice daily for 1 more week  -Radiology Studies: None today  -Therapies: Continue PT/OT, continue with outpatient therapy   -Transition off assistive device as seen fit by patient and therapy  -Follow-up: 7 weeks with x-rays        Caroline Oh presents today for follow up of joint replacement above. Pain controlled with current treatment plan. Patient has begun physical therapy. They are currently doing therapy at [facility]. They are currently ambulating with Walker.     Review of Systems:   Constitutional: See HPI for pain assessment, No significant weight loss, recent trauma. Denies fevers/chills  Cardiovascular: No chest pain, shortness of breath  Respiratory: No difficulty breathing, cough  Gastrointestinal: No nausea, vomiting, diarrhea, constipation  Musculoskeletal: Noted in HPI, no arthralgias   Integumentary: No rashes, easy bruising, redness   Neurological: no numbness or tingling in extremities, no gait disturbances     Patient Active Problem List   Diagnosis    Arthritis of knee    Benign essential hypertension    Hyperlipidemia    Hypertriglyceridemia    Medicare annual wellness visit, subsequent    Anxiety    Status post right knee replacement    Right knee pain    Acquired hypothyroidism    Primary osteoarthritis of left knee       =================================  EXAM  =================================  GENERAL: A/Ox3, NAD. Appears healthy, well nourished  CARDIAC: regular rate  LUNGS: Breathing non-labored    MUSCULOSKELETAL:  Laterality: left knee  - Incision: No overlying, erythema, induration, or drainage. Incision healing well with no  wound dehiscence  - ROM: 5 - 105 deg  - Palpation: appropriate post operative TTP along surgical incision  - compartments soft, negative homans  - can active straight leg raise with no extensor lag    NEUROVASCULAR:  - Neurovascular Status: sensation intact to light touch distally  - Capillary refill brisk at extremities, Bilateral dorsalis pedis pulse 2+     IMAGING: none today      Sofia Hamilton DO  Attending Surgeon  Joint Replacement and Adult Reconstructive Surgery  [place], [place]

## 2025-01-30 ENCOUNTER — TREATMENT (OUTPATIENT)
Dept: PHYSICAL THERAPY | Facility: CLINIC | Age: 70
End: 2025-01-30
Payer: MEDICARE

## 2025-01-30 DIAGNOSIS — M17.12 PRIMARY OSTEOARTHRITIS OF LEFT KNEE: ICD-10-CM

## 2025-01-30 DIAGNOSIS — Z96.652 S/P TOTAL KNEE ARTHROPLASTY, LEFT: Primary | ICD-10-CM

## 2025-01-30 PROCEDURE — 97140 MANUAL THERAPY 1/> REGIONS: CPT | Mod: GP | Performed by: PHYSICAL THERAPIST

## 2025-01-30 PROCEDURE — 97110 THERAPEUTIC EXERCISES: CPT | Mod: GP | Performed by: PHYSICAL THERAPIST

## 2025-01-30 NOTE — PROGRESS NOTES
Physical Therapy Treatment    Patient Name: Caroline Oh  MRN: [id number]  Today's Date: 1/30/2025  Visit 2/13  DOI/DOS:  1/10/25  End date:  4/27/25  Referred by:   Sofia Hamilton  Time Calculation  Start Time: 1003  Stop Time: 1046  Time Calculation (min): 43 min     PT Therapeutic Procedures Time Entry  Manual Therapy Time Entry: 12  Therapeutic Exercise Time Entry: 31                 Diagnosis:   1. S/P total knee arthroplasty, left        2. Primary osteoarthritis of left knee  Referral to Physical Therapy            PRECAUTIONS:  None    SUBJECTIVE:  She reports 2/10 anterior L knee pain.  She saw Dr. Hamilton who was pleased with her progress.    HEP compliance: yes    OBJECTIVE:  - good patellar mobility all dir  - AROM flex = 110 and ext = -2 at start of rx  - steri strips still present but fewer.  - presents amb with SPC in R hand    Treatment:  - initiated exercise for ROM, strength and WB.  Therapeutic Exercise  Therapeutic Exercise Activity 1: heel slide x10  Therapeutic Exercise Activity 2: SAQ 2x12  Therapeutic Exercise Activity 3: LAQ 2x12  Therapeutic Exercise Activity 4: wgt shift lateral/forward x10 ea  Therapeutic Exercise Activity 5: squat 2x12  Therapeutic Exercise Activity 6: SLR 2x12  Therapeutic Exercise Activity 7: standing knee flex, 2x12  Therapeutic Exercise Activity 8: bike L1, 5 min  Therapeutic Exercise Activity 9: step ups, 4 in 2x12  Manual Therapy  Manual Therapy Activity 1: PROM into flex and ext             ASSESSMENT:  Good ambar of rx - she demonstrated AROM flex = 117 after rx.  She is progressing well and will benefit from continued therapy to further improve strength, ROM and WB for return to walking with normal gait and climbing stairs.    PLAN:  Increase reps as ambar.

## 2025-02-03 ENCOUNTER — TREATMENT (OUTPATIENT)
Dept: PHYSICAL THERAPY | Facility: CLINIC | Age: 70
End: 2025-02-03
Payer: MEDICARE

## 2025-02-03 DIAGNOSIS — M17.12 PRIMARY OSTEOARTHRITIS OF LEFT KNEE: ICD-10-CM

## 2025-02-03 DIAGNOSIS — Z96.652 S/P TOTAL KNEE ARTHROPLASTY, LEFT: ICD-10-CM

## 2025-02-03 PROCEDURE — 97110 THERAPEUTIC EXERCISES: CPT | Mod: GP

## 2025-02-03 PROCEDURE — 97140 MANUAL THERAPY 1/> REGIONS: CPT | Mod: GP

## 2025-02-03 NOTE — PROGRESS NOTES
Physical Therapy Treatment    Patient Name: Caroline Oh  MRN: [id number]  Today's Date: 2/3/2025  Visit 3/13  DOI/DOS:  1/10/25  End date:  4/27/25  Referred by:   Sofia Hamilton  Time Calculation  Start Time: 1343  Stop Time: 1428  Time Calculation (min): 45 min     PT Therapeutic Procedures Time Entry  Manual Therapy Time Entry: 12  Therapeutic Exercise Time Entry: 33                 Diagnosis:   1. Primary osteoarthritis of left knee  Follow Up In Physical Therapy      2. S/P total knee arthroplasty, left  Follow Up In Physical Therapy            PRECAUTIONS:  none    SUBJECTIVE:  Pt reports to the clinic with stiffness and soreness that she rates a 4/10 as she is walking into the clinic.  Reports being sore after last session.  Pt reports some pain on the lateral knee with heel slides and states Dr. Hamilton assured her this was to be expected.      HEP compliance: yes, reports they are getting easier but heel slides are still challenging.    OBJECTIVE:  Ambulating to clinic with straight cane,   Lt knee flexion AROM= 110 prior to session  Lt knee extension AROM= 2 prior to session    Treatment: continued with strength and ROM exercises this session, progressed reps on LAQ's and progressed lateral weight shifts to SLS    Therapeutic Exercise  Therapeutic Exercise Activity 1: heel slide x10  Therapeutic Exercise Activity 2: SAQ 2x12  Therapeutic Exercise Activity 3: LAQ 3x10  Therapeutic Exercise Activity 4: wgt shift forward x10  Therapeutic Exercise Activity 5: squat 2x12  Therapeutic Exercise Activity 6: SLR 2x12  Therapeutic Exercise Activity 7: standing knee flex, 3x10  Therapeutic Exercise Activity 8: bike L1, 5 min  Therapeutic Exercise Activity 9: step ups, 4 in 3x12  Therapeutic Exercise Activity 10: SLS 3x30 sec  Therapeutic Exercise Activity 11: Knee flexion on step, 1x12  Manual Therapy  Manual Therapy Activity 1: PROM into flex and ext             ASSESSMENT: Pt tolerated today's session well.   Was able to progress to SLS balance and also increased reps with LAQ, step up's and standing knee flexion.  Pt able to achieve knee flexion AROM of 116 degrees at the end of session and reports no increase in soreness.  Pt requires further skilled therapy to continue progress with strength, ROM, and WB needed to return to normal gait pattern and stair negotiation.     Active       PT Problem       Pt will be independent with HEP (Met)       Start:  01/28/25    Expected End:  02/04/25    Resolved:  02/03/25         Pt able to improve Knee Flexion ROM to 120 degrees needed for safety with ADLs (Progressing)       Start:  01/28/25    Expected End:  03/25/25            Improve MMT of Lt knee to 5/5 to improve ability to get up from low toilet seat safely (Progressing)       Start:  01/28/25    Expected End:  03/25/25            Pt able to ambulate without use of assistive device (Progressing)       Start:  01/28/25    Expected End:  03/25/25            Improve LEFS score to 50 (Progressing)       Start:  01/28/25    Expected End:  03/25/25              PLAN: continue with ROM and strengthening ex's, progress as tolerated

## 2025-02-05 ENCOUNTER — TREATMENT (OUTPATIENT)
Dept: PHYSICAL THERAPY | Facility: CLINIC | Age: 70
End: 2025-02-05
Payer: MEDICARE

## 2025-02-05 ENCOUNTER — APPOINTMENT (OUTPATIENT)
Dept: PRIMARY CARE | Facility: CLINIC | Age: 70
End: 2025-02-05
Payer: MEDICARE

## 2025-02-05 DIAGNOSIS — M17.12 PRIMARY OSTEOARTHRITIS OF LEFT KNEE: ICD-10-CM

## 2025-02-05 DIAGNOSIS — Z96.652 S/P TOTAL KNEE ARTHROPLASTY, LEFT: ICD-10-CM

## 2025-02-05 PROCEDURE — 97140 MANUAL THERAPY 1/> REGIONS: CPT | Mod: GP

## 2025-02-05 PROCEDURE — 97110 THERAPEUTIC EXERCISES: CPT | Mod: GP

## 2025-02-05 NOTE — PROGRESS NOTES
Physical Therapy Treatment    Patient Name: Caroline Oh  MRN: [id number]  Today's Date: 2/5/2025  Visit 4/13  DOI/DOS:  1/10/25  End date:  4/27/25  Referred by:   Sofia Hamilton  Time Calculation  Start Time: 1345  Stop Time: 1431  Time Calculation (min): 46 min     PT Therapeutic Procedures Time Entry  Manual Therapy Time Entry: 10  Therapeutic Exercise Time Entry: 36                 Diagnosis:   1. Primary osteoarthritis of left knee  Follow Up In Physical Therapy      2. S/P total knee arthroplasty, left  Follow Up In Physical Therapy            PRECAUTIONS:  none    SUBJECTIVE:  Pt reports the knee is doing well and she has 0/10 pain prior to session but does still feel slightly stiff and achy.  Didn't take pain medication before bed last night and reports no increase in symptoms.  Pt was able to put her own socks and shoes on today as well.  Reports ex's are going well.     HEP compliance: yes, continues to have some discomfort with heel slides.    OBJECTIVE:  Knee flexion AROM= 112  Knee extension AROM=1    Treatment: continued with manual and strengthening ex's for the Lt LE, progressed to resisted hamstring curls and increased reps with SAQ, LAQ, and squats    Therapeutic Exercise  Therapeutic Exercise Activity 1: heel slide x10  Therapeutic Exercise Activity 2: SAQ 3x10  Therapeutic Exercise Activity 3: LAQ 3x10  Therapeutic Exercise Activity 4: wgt shift forward x10  Therapeutic Exercise Activity 5: squat 3x10  Therapeutic Exercise Activity 6: SLR 3x10  Therapeutic Exercise Activity 7: HS Curl, 2x12, orange TB  Therapeutic Exercise Activity 8: bike L1, 5 min  Therapeutic Exercise Activity 9: step ups, 4 in 3x12  Therapeutic Exercise Activity 10: SLS 3x30 sec  Therapeutic Exercise Activity 12: Supine clamshells 2x12, orange TB  Manual Therapy  Manual Therapy Activity 1: PROM into flex and ext             ASSESSMENT: Pt tolerated today's session well.  She was able to progress reps with squats, LAQ,  and SAQ, and was also able to progress to resisted hamstring curl exercise.  Pt also showed improved Lt knee flexion AROM at the end of today's session where she was measured at 118 degrees.  Pt requires further skilled therapy to continue progress with knee AROM, strength and WB needed to return to normal gait pattern and stair negotiation.    Active       PT Problem       Pt will be independent with HEP (Met)       Start:  01/28/25    Expected End:  02/04/25    Resolved:  02/03/25         Pt able to improve Knee Flexion ROM to 120 degrees needed for safety with ADLs (Progressing)       Start:  01/28/25    Expected End:  03/25/25            Improve MMT of Lt knee to 5/5 to improve ability to get up from low toilet seat safely (Progressing)       Start:  01/28/25    Expected End:  03/25/25            Pt able to ambulate without use of assistive device (Progressing)       Start:  01/28/25    Expected End:  03/25/25            Improve LEFS score to 50 (Progressing)       Start:  01/28/25    Expected End:  03/25/25              PLAN: continue with manual therapy and strengthening ex's, progress as tolerated, add ankle weight with LAQ/SAQ

## 2025-02-11 ENCOUNTER — TREATMENT (OUTPATIENT)
Dept: PHYSICAL THERAPY | Facility: CLINIC | Age: 70
End: 2025-02-11
Payer: MEDICARE

## 2025-02-11 DIAGNOSIS — M17.12 PRIMARY OSTEOARTHRITIS OF LEFT KNEE: ICD-10-CM

## 2025-02-11 DIAGNOSIS — Z96.652 S/P TOTAL KNEE ARTHROPLASTY, LEFT: ICD-10-CM

## 2025-02-11 PROCEDURE — 97140 MANUAL THERAPY 1/> REGIONS: CPT | Mod: GP | Performed by: PHYSICAL THERAPIST

## 2025-02-11 PROCEDURE — 97110 THERAPEUTIC EXERCISES: CPT | Mod: GP | Performed by: PHYSICAL THERAPIST

## 2025-02-11 NOTE — PROGRESS NOTES
Physical Therapy Treatment    Patient Name: Caroline Oh  MRN: [id number]  Today's Date: 2/11/2025  Visit 5/13  DOI/DOS:  1/10/25  End date:  4/27/25  Referred by:   Sofia Hamilton  Time Calculation  Start Time: 1051  Stop Time: 1147  Time Calculation (min): 56 min     PT Therapeutic Procedures Time Entry  Manual Therapy Time Entry: 11  Therapeutic Exercise Time Entry: 35  PT Modalities Time Entry  Hot/Cold Pack Time Entry: 10              Diagnosis:   1. Primary osteoarthritis of left knee  Follow Up In Physical Therapy      2. S/P total knee arthroplasty, left  Follow Up In Physical Therapy            PRECAUTIONS:  none    SUBJECTIVE:  She reports 0/10 pain but does have stiffness.  She is able to walk more and can shower by herself now.  She can transfer from the toilet on her own at times now.  She needs the cane for the first few steps but then doesn't need it as much.  She doesn't use it much around her home.  HEP compliance: yes    OBJECTIVE:  - L knee AROM flex = 115 and ext = -2 at start of rx   - incision well healed  - presents amb with SPC    Treatment:  - continued with exercise to improve strength and gait.  Added weight prn to progress strength.  Increased reps prn.  Therapeutic Exercise  Therapeutic Exercise Activity 2: SAQ, 2 lbs 3x10  Therapeutic Exercise Activity 3: LAQ, 2 lbs 3x10  Therapeutic Exercise Activity 5: squat 3x12  Therapeutic Exercise Activity 6: SLR, 2lbs, 3x10  Therapeutic Exercise Activity 7: HS Curl, 2x12, green TB  Therapeutic Exercise Activity 8: bike L1, 5 min  Therapeutic Exercise Activity 9: step ups, 6 in 3x12  Therapeutic Exercise Activity 10: SLS 3x30 sec  Therapeutic Exercise Activity 12: Supine Clamshell, GN TB, 3x12  Manual Therapy  Manual Therapy Activity 1: flex mobs        Modalities  Modality 1: Untimed Cold Pack (CP to L knee after exercise, 10 min, supine with legs elevated.)    ASSESSMENT:  Meredith rx well - challenged by addition of weight.  Needs cues for  proper gait when amb without her cane as she tends to lack hip ext in order to gain full terminal phase of gait.  AROM = 0 - 121 after rx.  She will benefit from continued therapy to increase strength for return to amb without cane, stairs and transfers.    Active       PT Problem       Pt will be independent with HEP (Met)       Start:  01/28/25    Expected End:  02/04/25    Resolved:  02/03/25         Pt able to improve Knee Flexion ROM to 120 degrees needed for safety with ADLs (Met)       Start:  01/28/25    Expected End:  03/25/25    Resolved:  02/11/25         Improve MMT of Lt knee to 5/5 to improve ability to get up from low toilet seat safely (Progressing)       Start:  01/28/25    Expected End:  03/25/25            Pt able to ambulate without use of assistive device (Progressing)       Start:  01/28/25    Expected End:  03/25/25            Improve LEFS score to 50 (Progressing)       Start:  01/28/25    Expected End:  03/25/25              PLAN:  Continue with current exercises.

## 2025-02-13 ENCOUNTER — TREATMENT (OUTPATIENT)
Dept: PHYSICAL THERAPY | Facility: CLINIC | Age: 70
End: 2025-02-13
Payer: MEDICARE

## 2025-02-13 DIAGNOSIS — M17.12 PRIMARY OSTEOARTHRITIS OF LEFT KNEE: ICD-10-CM

## 2025-02-13 DIAGNOSIS — Z96.652 S/P TOTAL KNEE ARTHROPLASTY, LEFT: ICD-10-CM

## 2025-02-13 PROCEDURE — 97110 THERAPEUTIC EXERCISES: CPT | Mod: GP | Performed by: PHYSICAL THERAPIST

## 2025-02-13 NOTE — PROGRESS NOTES
Physical Therapy Treatment    Patient Name: Caroline Oh  MRN: [id number]  Today's Date: 2/13/2025  Visit 6/13  DOI/DOS:  1/10/25  End date:  4/27/25  Referred by:   Sofia Hamilton  Time Calculation  Start Time: 1050  Stop Time: 1135  Time Calculation (min): 45 min     PT Therapeutic Procedures Time Entry  Manual Therapy Time Entry: 7  Therapeutic Exercise Time Entry: 38                 Diagnosis:   1. Primary osteoarthritis of left knee  Follow Up In Physical Therapy      2. S/P total knee arthroplasty, left  Follow Up In Physical Therapy            PRECAUTIONS:  None    SUBJECTIVE:  She had significant soreness after last session in [place] [facility].  Today she reports 4/10 pain at the anterior L knee.  HEP compliance: yes    OBJECTIVE:  - continues to amb with SPC  - L knee AROM = 0 - 120 at start of rx.  - good patellar mobility all dir  - QS = fair    Treatment:  - continued with exercise for strength of the L knee.  Added hip ext and abd.  Therapeutic Exercise  Therapeutic Exercise Activity 1: prone hip ext B x10  Therapeutic Exercise Activity 2: SAQ, 2 lbs 3x10  Therapeutic Exercise Activity 3: LAQ, 2 lbs 3x10  Therapeutic Exercise Activity 4: hip abd L x10  Therapeutic Exercise Activity 5: squat 3x12  Therapeutic Exercise Activity 6: SLR, 2lbs, 3x10  Therapeutic Exercise Activity 7: HS Curl, 3x12, green TB  Therapeutic Exercise Activity 8: bike L1, 5 min  Therapeutic Exercise Activity 12: Supine Clamshell, GN TB, 3x12  Manual Therapy  Manual Therapy Activity 1: hamstring and quad stretch             ASSESSMENT:  Meredith rx well - challenged by exercise but had no pain.  She was able to bend her knee to 120 right at the start of her session today.  She is progressing well and needs to improve strength for normal gait, transfers and stairs.  She will benefit from continued PT to increase strength.    Active       PT Problem       Pt will be independent with HEP (Met)       Start:  01/28/25    Expected End:  02/04/25     Correcting SIRS due to DKA to   Sepsis, POA due to uti now that UA has resulted  --start rocephin  --check poc lactic.  --urine culture pending    Ebony Vilchis MD Resolved:  02/03/25         Pt able to improve Knee Flexion ROM to 120 degrees needed for safety with ADLs (Met)       Start:  01/28/25    Expected End:  03/25/25    Resolved:  02/11/25         Improve MMT of Lt knee to 5/5 to improve ability to get up from low toilet seat safely (Progressing)       Start:  01/28/25    Expected End:  03/25/25            Pt able to ambulate without use of assistive device (Progressing)       Start:  01/28/25    Expected End:  03/25/25            Improve LEFS score to 50 (Progressing)       Start:  01/28/25    Expected End:  03/25/25              PLAN:  Continue increase strength.

## 2025-02-18 ENCOUNTER — TREATMENT (OUTPATIENT)
Dept: PHYSICAL THERAPY | Facility: CLINIC | Age: 70
End: 2025-02-18
Payer: MEDICARE

## 2025-02-18 DIAGNOSIS — M17.12 PRIMARY OSTEOARTHRITIS OF LEFT KNEE: ICD-10-CM

## 2025-02-18 DIAGNOSIS — Z96.652 S/P TOTAL KNEE ARTHROPLASTY, LEFT: ICD-10-CM

## 2025-02-18 PROCEDURE — 97140 MANUAL THERAPY 1/> REGIONS: CPT | Mod: GP | Performed by: PHYSICAL THERAPIST

## 2025-02-18 PROCEDURE — 97110 THERAPEUTIC EXERCISES: CPT | Mod: GP | Performed by: PHYSICAL THERAPIST

## 2025-02-18 NOTE — PROGRESS NOTES
Physical Therapy Treatment    Patient Name: Caroline Oh  MRN: [id number]  Today's Date: 2/18/2025  Visit 7/13  DOI/DOS:  1/10/25  End date:  4/27/25  Referred by:   Sofia Hamilton  Time Calculation  Start Time: 1047  Stop Time: 1130  Time Calculation (min): 43 min     PT Therapeutic Procedures Time Entry  Manual Therapy Time Entry: 11  Therapeutic Exercise Time Entry: 32                 Diagnosis:   1. Primary osteoarthritis of left knee  Follow Up In Physical Therapy      2. S/P total knee arthroplasty, left  Follow Up In Physical Therapy            PRECAUTIONS:  none    SUBJECTIVE:  - she did well after her last rx.  She had pain Sunday night without known cause.  It was rated 7-8/10 and located at the subpatellar area and was mostly when she was in bed.  0/10 pain at present though she is stiff.  HEP compliance: yes    OBJECTIVE:  - good patellar mobility all dir  - AROM = 0 - 120  - incision healing well.  No steri strips present  - she continues to walk with a cane for the first couple steps.    Treatment:  - continued with strength exercises.  Added weight to hip abd and ext.  Resumed step ups.  Added calf raises.  Therapeutic Exercise  Therapeutic Exercise Activity 1: prone hip ext B, 2 lbsx10  Therapeutic Exercise Activity 3: LAQ, 2 lbs 3x10  Therapeutic Exercise Activity 4: hip abd L, 2 lbs,  x10  Therapeutic Exercise Activity 5: squat 3x12  Therapeutic Exercise Activity 6: SLR, 2lbs, 3x10  Therapeutic Exercise Activity 7: HS Curl, 3x12, green TB  Therapeutic Exercise Activity 8: NuStep L1, 5 min  Therapeutic Exercise Activity 9: step ups, 6 in 3x12  Therapeutic Exercise Activity 11: calf raises 3x12  Therapeutic Exercise Activity 12: Supine Clamshell, GN TB, 3x12  Manual Therapy  Manual Therapy Activity 1: knee flex mobs  Manual Therapy Activity 2: TFM to incision           - her HEP was updated as seen below:    Access Code: [email or web address]  URL: [email or web address]  Date:  02/18/2025  Prepared by: Reina Arango    Exercises  - Clamshell  - 1 x daily - 7 x weekly - 3 sets - 10 reps  - Squat with Chair Touch  - 1 x daily - 7 x weekly - 3 sets - 10 reps  - Standing Hip Abduction with Anchored Resistance  - 1 x daily - 7 x weekly - 3 sets - 10 reps    ASSESSMENT:  Ambar rx well - 0/10 pain with exercise.   She is progressing well and just working on strength of the knee and hip to facilitate normal gait.      PLAN:  Continue to progress strengthening exercises as ambar.

## 2025-02-20 ENCOUNTER — TREATMENT (OUTPATIENT)
Dept: PHYSICAL THERAPY | Facility: CLINIC | Age: 70
End: 2025-02-20
Payer: MEDICARE

## 2025-02-20 DIAGNOSIS — Z96.652 S/P TOTAL KNEE ARTHROPLASTY, LEFT: ICD-10-CM

## 2025-02-20 DIAGNOSIS — M17.12 PRIMARY OSTEOARTHRITIS OF LEFT KNEE: ICD-10-CM

## 2025-02-20 PROCEDURE — 97110 THERAPEUTIC EXERCISES: CPT | Mod: GP | Performed by: PHYSICAL THERAPIST

## 2025-02-20 NOTE — PROGRESS NOTES
Physical Therapy Treatment    Patient Name: Caroline Dobbinsurfield  MRN: [id number]  Today's Date: 2/20/2025  Visit 8/13  DOI/DOS:  1/10/25  End date:  4/27/25  Referred by:   Sofia Hamilton  Time Calculation  Start Time: 1051  Stop Time: 1132  Time Calculation (min): 41 min     PT Therapeutic Procedures Time Entry  Manual Therapy Time Entry: 5  Therapeutic Exercise Time Entry: 36                 Diagnosis:   1. Primary osteoarthritis of left knee  Follow Up In Physical Therapy      2. S/P total knee arthroplasty, left  Follow Up In Physical Therapy            PRECAUTIONS:  None    SUBJECTIVE:  0/10 pain today but does have stiffness.  She did well after last rx.  She ascend/descends stairs with a step to gait due to weakness vs pain.  HEP compliance: yes.  She requests a video of standing hip abd with band exercise    OBJECTIVE:  - good patellar mobility all dir  - strong QS  - AROM = 0 - 121  - amb with L lateral lean.  Presents without SPC.    Treatment:  - continue with strengthening the L knee.  Added amb on stairs.Therapeutic Exercise  Therapeutic Exercise Activity 1: prone hip ext B, 2 lbs 3x10  Therapeutic Exercise Activity 3: LAQ, 2 lbs 3x10  Therapeutic Exercise Activity 4: hip abd L, 2 lbs, 3x10  Therapeutic Exercise Activity 5: squat 3x12  Therapeutic Exercise Activity 6: SLR, 2lbs, 3x10  Therapeutic Exercise Activity 7: HS Curl, 3x12, green TB  Therapeutic Exercise Activity 8: NuStep L1, 5 min  Therapeutic Exercise Activity 9: stair amb reciprocally up/down 17 steps  Therapeutic Exercise Activity 11: calf raises 3x12  Therapeutic Exercise Activity 12: Supine Clamshell, GN TB, 3x12  Manual Therapy  Manual Therapy Activity 1: knee flex mobs  Manual Therapy Activity 2: TFM to incision           - she was given a video of hip abd exercise as seen below:    Access Code: [email or web address]  URL: [email or web address]  Date: 02/20/2025  Prepared by: Reina Arango    Exercises  - Standing Hip  Abduction with Anchored Resistance  - 1 x daily - 7 x weekly - 3 sets - 10 reps    ASSESSMENT:  Meredith rx well.  She is able to amb stairs reciprocally in the clinic but does push off the L LE with her knee in valgus and leaning toward the R.  She is primarily working on strength at this point for normal gait and amb stairs with a reciprocal gait.    PLAN:  Continue to progress strength exercise

## 2025-02-25 ENCOUNTER — TREATMENT (OUTPATIENT)
Dept: PHYSICAL THERAPY | Facility: CLINIC | Age: 70
End: 2025-02-25
Payer: MEDICARE

## 2025-02-25 DIAGNOSIS — Z96.652 S/P TOTAL KNEE ARTHROPLASTY, LEFT: ICD-10-CM

## 2025-02-25 DIAGNOSIS — M17.12 PRIMARY OSTEOARTHRITIS OF LEFT KNEE: ICD-10-CM

## 2025-02-25 PROCEDURE — 97110 THERAPEUTIC EXERCISES: CPT | Mod: GP | Performed by: PHYSICAL THERAPIST

## 2025-02-25 NOTE — PROGRESS NOTES
Physical Therapy Treatment    Patient Name: Caroline Dobbinsurfield  MRN: [id number]  Today's Date: 2/25/2025  Visit 9/13  DOI/DOS:  1/10/25  End date:  4/27/25  Referred by:   Sofia Hamilton  Time Calculation  Start Time: 1047  Stop Time: 1128  Time Calculation (min): 41 min     PT Therapeutic Procedures Time Entry  Manual Therapy Time Entry: 5  Therapeutic Exercise Time Entry: 36                 Diagnosis:   1. Primary osteoarthritis of left knee  Follow Up In Physical Therapy      2. S/P total knee arthroplasty, left  Follow Up In Physical Therapy            PRECAUTIONS:  none    SUBJECTIVE:  She is feeling pretty good.  She gets intermittent 1-2/10 L knee pain.,  the location varies.  She did well after her last session.  HEP compliance: yes    OBJECTIVE:  - L knee AROM = 0 - 120  - a few areas at the superior incision are thick, but otherwise the incision is flat and smooth  - slight L lateral lean when walking    Treatment:  - continued with strength exercises.  Advanced hip abd and ext to multi hip machine.  Therapeutic Exercise  Therapeutic Exercise Activity 1: multi hip machine abd/ext, L2, 2x12  Therapeutic Exercise Activity 2: bridge 3x12  Therapeutic Exercise Activity 3: LAQ, 2 lbs 3x12  Therapeutic Exercise Activity 5: squat 3x12  Therapeutic Exercise Activity 6: SLR, 2lbs, 3x12  Therapeutic Exercise Activity 7: HS Curl, 3x12, green TB  Therapeutic Exercise Activity 8: NuStep L1, 5 min  Therapeutic Exercise Activity 9: stair amb reciprocally up/down 17 steps  Therapeutic Exercise Activity 11: calf raises 3x12  Therapeutic Exercise Activity 12: Supine Clamshell, GN TB, 3x12  Manual Therapy  Manual Therapy Activity 2: TFM to incision             ASSESSMENT:  Meredith rx well - no pain with advanced exercise.  She is primarily working on strength at this point to normalize her gait and improve her ability to amb stairs reciprocally.  She will benefit from continued therapy to further improve strength for  ADLs.    PLAN:  Increase weight for LAQ and HS curl.

## 2025-02-27 ENCOUNTER — TREATMENT (OUTPATIENT)
Dept: PHYSICAL THERAPY | Facility: CLINIC | Age: 70
End: 2025-02-27
Payer: MEDICARE

## 2025-02-27 DIAGNOSIS — M17.12 PRIMARY OSTEOARTHRITIS OF LEFT KNEE: ICD-10-CM

## 2025-02-27 DIAGNOSIS — Z96.652 S/P TOTAL KNEE ARTHROPLASTY, LEFT: ICD-10-CM

## 2025-02-27 PROCEDURE — 97140 MANUAL THERAPY 1/> REGIONS: CPT | Mod: GP | Performed by: PHYSICAL THERAPIST

## 2025-02-27 PROCEDURE — 97110 THERAPEUTIC EXERCISES: CPT | Mod: GP | Performed by: PHYSICAL THERAPIST

## 2025-02-27 NOTE — PROGRESS NOTES
Physical Therapy Treatment    Patient Name: Caroline Oh  MRN: [id number]  Today's Date: 2/27/2025  Visit 10/13  DOI/DOS:  1/10/25  End date:  4/27/25  Referred by:   Sofia Hamilton  Time Calculation  Start Time: 1049  Stop Time: 1133  Time Calculation (min): 44 min     PT Therapeutic Procedures Time Entry  Manual Therapy Time Entry: 10  Therapeutic Exercise Time Entry: 34                 Diagnosis:   1. Primary osteoarthritis of left knee  Follow Up In Physical Therapy      2. S/P total knee arthroplasty, left  Follow Up In Physical Therapy            PRECAUTIONS:  none    SUBJECTIVE:  She has discomfort still with sleeping though this is improving.  She is working on walking better.  0/10 pain.  She does have some aching with rainy weather today.  She was a little sore after last rx.  HEP compliance: yes    OBJECTIVE:  - continues to have 3 areas at the superior incision that are a little thick.  - note improve gait with less L lateral lean  - AROM = 0 - 121    Treatment:  - continued with strengthening.  Increased weight for LAQ.  Therapeutic Exercise  Therapeutic Exercise Activity 1: multi hip machine abd/ext, L2, 2x12  Therapeutic Exercise Activity 2: bridge 3x12  Therapeutic Exercise Activity 3: LAQ, 3 lbs 3x12  Therapeutic Exercise Activity 5: squat 3x12  Therapeutic Exercise Activity 7: HS Curl, 3x12, green TB  Therapeutic Exercise Activity 8: NuStep L1, 5 min  Therapeutic Exercise Activity 9: stair amb reciprocally up/down 17 steps  Therapeutic Exercise Activity 11: calf raises 3x12  Therapeutic Exercise Activity 12: Supine Clamshell, GN TB, 3x12  Manual Therapy  Manual Therapy Activity 2: TFM to incision             ASSESSMENT:  Meredith rx well.  She is progressing well and as she gets stronger her gain continues to improve.  She will benefit from continued therapy to increase strength.      PLAN:  She will be OOT next week.  Possible discharge when she returns the following week depending on strength  levels.

## 2025-03-11 ENCOUNTER — TREATMENT (OUTPATIENT)
Dept: PHYSICAL THERAPY | Facility: CLINIC | Age: 70
End: 2025-03-11
Payer: MEDICARE

## 2025-03-11 DIAGNOSIS — M17.12 PRIMARY OSTEOARTHRITIS OF LEFT KNEE: ICD-10-CM

## 2025-03-11 DIAGNOSIS — Z96.652 S/P TOTAL KNEE ARTHROPLASTY, LEFT: ICD-10-CM

## 2025-03-11 PROCEDURE — 97110 THERAPEUTIC EXERCISES: CPT | Mod: GP | Performed by: PHYSICAL THERAPIST

## 2025-03-11 NOTE — PROGRESS NOTES
Physical Therapy Treatment/Discharge    Patient Name: Caroline Oh  MRN: [id number]  Today's Date: 3/11/2025  Visit 11/13  DOI/DOS:  1/10/25  End date:  4/27/25  Referred by:   Sofia Hamilton  Time Calculation  Start Time: 1221  Stop Time: 1301  Time Calculation (min): 40 min     PT Therapeutic Procedures Time Entry  Therapeutic Exercise Time Entry: 40                 Diagnosis:   1. Primary osteoarthritis of left knee  Follow Up In Physical Therapy      2. S/P total knee arthroplasty, left  Follow Up In Physical Therapy            PRECAUTIONS:  none    SUBJECTIVE:  0/10 pain today.  She did well after her last session.  She is able to transfers from a chair and toilet without issue.  If she hangs on to the railing she can go up/down stairs with a reciprocal gait.    Hep compliance: yes    OBJECTIVE:  -  MMT L quad = 5/5, HS = 4+/5, hip abd = 4+/5  - L knee AROM = 0 - 120  - amb with normal gait   - incision well healed.  She does have several thick areas at the superior aspect  - LFES = 50 (22 at eval)    Treatment:  - continued with strength exercises.  Therapeutic Exercise  Therapeutic Exercise Activity 1: multi hip machine abd/ext, L2, 2x12  Therapeutic Exercise Activity 2: bridge 3x12  Therapeutic Exercise Activity 3: LAQ, 3 lbs 3x12  Therapeutic Exercise Activity 5: squat 3x12  Therapeutic Exercise Activity 7: HS Curl, 3x12, green TB  Therapeutic Exercise Activity 8: NuStep L1, 5 min  Therapeutic Exercise Activity 11: calf raises 3x12              - she was given a HEP to continue independently as seen below:    Access Code: [email or web address]  URL: [email or web address]  Date: 03/11/2025  Prepared by: Reina Arango    Exercises  - Supine Bridge  - 1 x daily - 4 x weekly - 3 sets - 10 reps  - Standing Hip Abduction with Anchored Resistance  - 1 x daily - 4 x weekly - 3 sets - 10 reps  - Standing Hip Extension with Anchored Resistance  - 1 x daily - 4 x weekly - 3 sets - 10 reps  - Sitting  Knee Extension with Resistance  - 1 x daily - 4 x weekly - 3 sets - 10 reps  - Seated Hamstring Curl with Anchored Resistance  - 1 x daily - 4 x weekly - 3 sets - 10 reps  - Squat with Chair Touch  - 1 x daily - 4 x weekly - 3 sets - 10 reps  - Heel Raises with Counter Support  - 1 x daily - 4 x weekly - 3 sets - 10 reps    ASSESSMENT:  Meredith rx well - no pain with exercise.  She demonstrates good strength and AROM.  She does not have functional limitations.  She has met all goals or is adequate for discharge.  She is able to continue independently with her HEP.    Resolved       PT Problem       Pt will be independent with HEP (Met)       Start:  01/28/25    Expected End:  02/04/25    Resolved:  02/03/25         Pt able to improve Knee Flexion ROM to 120 degrees needed for safety with ADLs (Met)       Start:  01/28/25    Expected End:  03/25/25    Resolved:  02/11/25         Improve MMT of Lt knee to 5/5 to improve ability to get up from low toilet seat safely (Adequate for Discharge)       Start:  01/28/25    Expected End:  03/25/25    Resolved:  03/11/25         Pt able to ambulate without use of assistive device (Met)       Start:  01/28/25    Expected End:  03/25/25    Resolved:  03/11/25         Improve LEFS score to 50 (Progressing)       Start:  01/28/25    Expected End:  03/25/25              PLAN:  Discharge to [facility]

## 2025-03-18 ENCOUNTER — APPOINTMENT (OUTPATIENT)
Dept: ORTHOPEDIC SURGERY | Facility: CLINIC | Age: 70
End: 2025-03-18
Payer: MEDICARE

## 2025-03-25 ENCOUNTER — APPOINTMENT (OUTPATIENT)
Dept: ORTHOPEDIC SURGERY | Facility: CLINIC | Age: 70
End: 2025-03-25
Payer: MEDICARE

## 2025-03-25 ENCOUNTER — HOSPITAL ENCOUNTER (OUTPATIENT)
Dept: RADIOLOGY | Facility: CLINIC | Age: 70
Discharge: HOME | End: 2025-03-25
Payer: MEDICARE

## 2025-03-25 VITALS — WEIGHT: 218 LBS | HEIGHT: 68 IN | BODY MASS INDEX: 33.04 KG/M2

## 2025-03-25 DIAGNOSIS — M17.12 PRIMARY OSTEOARTHRITIS OF LEFT KNEE: ICD-10-CM

## 2025-03-25 PROCEDURE — 99024 POSTOP FOLLOW-UP VISIT: CPT | Performed by: ORTHOPAEDIC SURGERY

## 2025-03-25 PROCEDURE — 1159F MED LIST DOCD IN RCRD: CPT | Performed by: ORTHOPAEDIC SURGERY

## 2025-03-25 PROCEDURE — 3008F BODY MASS INDEX DOCD: CPT | Performed by: ORTHOPAEDIC SURGERY

## 2025-03-25 PROCEDURE — 73560 X-RAY EXAM OF KNEE 1 OR 2: CPT | Mod: LT

## 2025-03-25 PROCEDURE — 1123F ACP DISCUSS/DSCN MKR DOCD: CPT | Performed by: ORTHOPAEDIC SURGERY

## 2025-03-25 RX ORDER — THYROID, PORCINE 30 MG/1
30 TABLET ORAL DAILY
COMMUNITY
Start: 2025-02-27

## 2025-03-25 ASSESSMENT — PAIN - FUNCTIONAL ASSESSMENT: PAIN_FUNCTIONAL_ASSESSMENT: NO/DENIES PAIN

## 2025-03-25 NOTE — PROGRESS NOTES
ORTHOPEDIC TOTAL JOINT  POST-OPERATIVE FOLLOW UP      ============================  IMPRESSION/PLAN:  ============================  69 y.o. female s/p Left Total Knee Replacement completed on 01/10/2025 and Right total knee replacement done 9/29/2023.    PLAN:  -Weightbearing: Weight bearing as tolerated  -DVT Prophylaxis: No longer needed  -Radiology Studies: None today  -Therapies: Continue regular exercise program  -Follow-up with 1 year venancio of surgery with x-rays        Caroline COY Adriano presents today for follow up of joint replacement above. Pain controlled with current treatment plan. They have completed therapy at [facility]. They are currently ambulating without assistance. Patient has no concerns overall and is doing well. XR done today on left knee.    Review of Systems:   Constitutional: See HPI for pain assessment, No significant weight loss, recent trauma. Denies fevers/chills  Cardiovascular: No chest pain, shortness of breath  Respiratory: No difficulty breathing, cough  Gastrointestinal: No nausea, vomiting, diarrhea, constipation  Musculoskeletal: Noted in HPI, no arthralgias   Integumentary: No rashes, easy bruising, redness   Neurological: no numbness or tingling in extremities, no gait disturbances     Patient Active Problem List   Diagnosis    Arthritis of knee    Benign essential hypertension    Hyperlipidemia    Hypertriglyceridemia    Medicare annual wellness visit, subsequent    Anxiety    Status post right knee replacement    Right knee pain    Acquired hypothyroidism    Primary osteoarthritis of left knee    S/P total knee arthroplasty, left       =================================  EXAM  =================================  GENERAL: A/Ox3, NAD. Appears healthy, well nourished  CARDIAC: regular rate  LUNGS: Breathing non-labored    MUSCULOSKELETAL:  Laterality: left knee  - Incision: No overlying, erythema, induration, or drainage. Incision healing well with no wound  dehiscence  - ROM: 0 - 120 deg  - Palpation: appropriate post operative TTP along surgical incision  - compartments soft, negative homans  - can active straight leg raise with no extensor lag    NEUROVASCULAR:  - Neurovascular Status: sensation intact to light touch distally  - Capillary refill brisk at extremities, Bilateral dorsalis pedis pulse 2+     IMAGIN views left knee demonstrate no signs of loosening or failure of left total knee arthroplasty. X-rays were personally reviewed by me.  Radiology reports were reviewed by me as well, if available at the time.        Sofia Hamilton DO  Attending Surgeon  Joint Replacement and Adult Reconstructive Surgery  [place], [place]

## 2026-01-13 ENCOUNTER — APPOINTMENT (OUTPATIENT)
Dept: ORTHOPEDIC SURGERY | Facility: CLINIC | Age: 71
End: 2026-01-13
Payer: MEDICARE

## (undated) DEVICE — GLOVE, SURGICAL, PI ORTHO PRO, BIOGEL, SZ 8.0

## (undated) DEVICE — BANDAGE, COFLEX, 6 X 5 YDS, TAN, STERILE, LF

## (undated) DEVICE — DRAPE, INSTRUMENT, W/POUCH, STERI DRAPE, 7 X 11 IN, DISPOSABLE, STERILE

## (undated) DEVICE — RESTRAINT, WRIST, XLONG, DISPOSABLE

## (undated) DEVICE — SUTURE, STRATAFIX PDS PLUS, 1, OS-6, SYMMETRIC 45CM, VIOLET

## (undated) DEVICE — SUTURE, VICRYL, 2-0, 18 IN CP-2, UNDYED

## (undated) DEVICE — CLOSURE SYSTEM, DERMABOND, PRINEO, 22CM, STERILE

## (undated) DEVICE — HOOD, SURGICAL, FLYTE HYBRID

## (undated) DEVICE — IRRIGATION SYSTEM, WOUND, PULSAVAC PLUS

## (undated) DEVICE — SYRINGE, 50 CC, LUER LOCK

## (undated) DEVICE — Device

## (undated) DEVICE — DRESSING, MEPILEX BORDER, POST-OP AG, 4 X 10 IN

## (undated) DEVICE — BLADE, MAKO, SAGITTAL, NARROW

## (undated) DEVICE — SUTURE, STRATAFIX, 3-0 MONOCRYL PLUS, PS-2 SPIRAL UNDYED

## (undated) DEVICE — TRACKING KIT, TM KNEE PROCEDURES, VIZADISC

## (undated) DEVICE — SOLUTION, INJECTION, USP, LACTATED RINGERS, LIFECARE, 1000ML

## (undated) DEVICE — STRYKER RIO DRAPE KIT (FORMERLY MFR'D BY MAKO)

## (undated) DEVICE — CHECKPOINT KIT, FEMORAL/ TIBIAL

## (undated) DEVICE — SYRINGE, 60 CC, IRRIGATION, BULB, CONTRO-BULB, PAPER POUCH

## (undated) DEVICE — PROTECTIVE, FOOT, FOAM PAD & COHESIVE WRAP, STERILE

## (undated) DEVICE — SUTURE, VICRYL, 1, 36 IN, CT-1, UNDYED

## (undated) DEVICE — CATHETER, SUCTION, CATH-N-GLOVE, PEEL POUCH, 18 FR

## (undated) DEVICE — GLOVE, SURGICAL, PROTEXIS PI , 7.5, PF, LF

## (undated) DEVICE — CUFF, TOURNIQUET, 30 X 4, DUAL PORT/SNGL BLADDER, DISP, LF